# Patient Record
Sex: FEMALE | Race: WHITE | NOT HISPANIC OR LATINO | Employment: UNEMPLOYED | URBAN - METROPOLITAN AREA
[De-identification: names, ages, dates, MRNs, and addresses within clinical notes are randomized per-mention and may not be internally consistent; named-entity substitution may affect disease eponyms.]

---

## 2017-11-05 ENCOUNTER — HOSPITAL ENCOUNTER (EMERGENCY)
Facility: HOSPITAL | Age: 37
Discharge: HOME | End: 2017-11-05
Attending: EMERGENCY MEDICINE | Admitting: EMERGENCY MEDICINE
Payer: COMMERCIAL

## 2017-11-05 ENCOUNTER — APPOINTMENT (EMERGENCY)
Dept: RADIOLOGY | Facility: HOSPITAL | Age: 37
End: 2017-11-05
Payer: COMMERCIAL

## 2017-11-05 VITALS
DIASTOLIC BLOOD PRESSURE: 74 MMHG | TEMPERATURE: 97.8 F | OXYGEN SATURATION: 100 % | HEART RATE: 75 BPM | SYSTOLIC BLOOD PRESSURE: 161 MMHG | WEIGHT: 114 LBS | RESPIRATION RATE: 18 BRPM

## 2017-11-05 DIAGNOSIS — S92.355A NONDISPLACED FRACTURE OF FIFTH METATARSAL BONE, LEFT FOOT, INITIAL ENCOUNTER FOR CLOSED FRACTURE: Primary | ICD-10-CM

## 2017-11-05 PROCEDURE — 73630 X-RAY EXAM OF FOOT: CPT

## 2017-11-05 PROCEDURE — 99283 EMERGENCY DEPT VISIT LOW MDM: CPT

## 2017-11-05 RX ORDER — METHOCARBAMOL 500 MG/1
500 TABLET, FILM COATED ORAL ONCE
Status: COMPLETED | OUTPATIENT
Start: 2017-11-05 | End: 2017-11-05

## 2017-11-05 RX ORDER — IBUPROFEN 600 MG/1
600 TABLET ORAL EVERY 6 HOURS PRN
Qty: 30 TABLET | Refills: 0 | Status: SHIPPED | OUTPATIENT
Start: 2017-11-05 | End: 2017-11-08

## 2017-11-05 RX ORDER — METHOCARBAMOL 500 MG/1
1000 TABLET, FILM COATED ORAL ONCE
Status: COMPLETED | OUTPATIENT
Start: 2017-11-05 | End: 2017-11-05

## 2017-11-05 RX ORDER — METHOCARBAMOL 750 MG/1
750 TABLET, FILM COATED ORAL 3 TIMES DAILY
Qty: 15 TABLET | Refills: 0 | Status: SHIPPED | OUTPATIENT
Start: 2017-11-05 | End: 2021-04-15

## 2017-11-05 RX ADMIN — METHOCARBAMOL 500 MG: 500 TABLET ORAL at 21:18

## 2017-11-05 RX ADMIN — METHOCARBAMOL 500 MG: 500 TABLET ORAL at 21:37

## 2017-11-06 NOTE — DISCHARGE INSTRUCTIONS
Foot Fracture in Adults   WHAT YOU NEED TO KNOW:   A foot fracture is a break in one or more of the bones in your foot  Foot fractures are commonly caused by trauma, falls, or repeated stress injuries  DISCHARGE INSTRUCTIONS:   Medicines:   · Antibiotics: This medicine is given to help treat or prevent an infection caused by bacteria  · NSAIDs:  These medicines decrease swelling and pain  NSAIDs are available without a doctor's order  Ask which medicine is right for you  Ask how much to take and when to take it  Take as directed  NSAIDs can cause stomach bleeding and kidney problems if not taken correctly  · Pain medicine: You may be given a prescription medicine to decrease pain  Do not wait until the pain is severe before you take this medicine  · Take your medicine as directed  Contact your healthcare provider if you think your medicine is not helping or if you have side effects  Tell him of her if you are allergic to any medicine  Keep a list of the medicines, vitamins, and herbs you take  Include the amounts, and when and why you take them  Bring the list or the pill bottles to follow-up visits  Carry your medicine list with you in case of an emergency  Follow up with your healthcare provider or bone specialist as directed: You may need to return to have your splint or stitches removed  You may also need to return for tests to make sure your foot is healing  Write down your questions so you remember to ask them during your visits  Wound care:  Carefully wash the wound with soap and water  Dry the area and put on new, clean bandages as directed  Change your bandages when they get wet or dirty  Self-care:   · Rest:  You may need to rest your foot and avoid activities that cause pain  For stress fractures, you will need to avoid the activity that caused the fracture until it heals  Ask when you can return to your normal activities such as work and sports      · Ice:  Ice helps decrease swelling and pain  Ice may also help prevent tissue damage  Use an ice pack or put crushed ice in a plastic bag  Cover it with a towel, and place it on your foot for 15 to 20 minutes every hour as directed  · Elevate your foot:  Raise your foot at or above the level of your heart as often as you can  This will help decrease swelling and pain  Prop your foot on pillows or blankets to keep it elevated comfortably  · Physical therapy: Once your foot has healed, a physical therapist can teach you exercises to help improve movement and strength, and to decrease pain  Splint care:   · Check the skin around your splint daily for any redness or open areas  · Do not use a sharp or pointed object to scratch your skin under the splint  · Do not remove your splint unless your healthcare provider or orthopedic surgeon says it is okay  Bathing with a splint:  Do not let your splint get wet  Before bathing, cover the splint with a plastic bag  Tape the bag to your skin above the splint to seal out the water  Keep your foot out of the water in case the bag leaks  Ask when it is okay to take a bath or shower  Assistive devices: You may be given a hard-soled shoe to wear while your foot is healing  You also may need to use crutches to help you walk while your foot heals  It is important to use your crutches correctly  Ask for more information about how to use crutches  Contact your healthcare provider or bone specialist if:   · You have a fever  · You have new sores around your boot or splint  · You have new or worsening trouble moving your foot  · You notice a foul smell coming from under your splint  · Your boot or splint gets damaged  · You have questions or concerns about your condition or care  Return to the emergency department if:   · The pain in your injured foot gets worse even after you rest and take pain medicine      · The skin or toes of your foot become numb, swollen, cold, white, or blue     · You have more pain or swelling than you did before the splint was put on  · Your wound is draining fluid or pus  · Blood soaks through your bandage  · Your leg feels warm, tender, and painful  It may look swollen and red  · You suddenly feel lightheaded and short of breath  · You have chest pain when you take a deep breath or cough  You may cough up blood  © 2017 [employer]  Information is for End User's use only and may not be sold, redistributed or otherwise used for commercial purposes  All illustrations and images included in CareNotes® are the copyrighted property of [employer]  or [employer]  The above information is an  only  It is not intended as medical advice for individual conditions or treatments  Talk to your doctor, nurse or pharmacist before following any medical regimen to see if it is safe and effective for you

## 2017-11-06 NOTE — ED PROVIDER NOTES
History  Chief Complaint   Patient presents with    Foot Injury     from home with left foot injury last night after falling     65-year-old female patient presents emergency department for evaluation of left foot injury sustained yesterday while she was wearing slippers, walking down the steps of her deck, twisted her foot in a way that she is not quite sure how  Patient does have ecchymosis and swelling of the lateral aspect of the foot  X-ray will be done to assess for Huber fracture  History provided by:  Patient   used: No    Foot Injury - Major   Location:  Foot  Injury: no    Foot location:  L foot  Pain details:     Quality:  Aching    Radiates to:  Does not radiate    Severity:  Mild    Onset quality:  Sudden    Timing:  Constant    Progression:  Worsening  Chronicity:  New  Dislocation: no    Tetanus status:  Up to date  Prior injury to area:  No  Relieved by:  Nothing  Worsened by:  Nothing  Ineffective treatments:  None tried  Associated symptoms: no decreased ROM, no fatigue, no itching, no muscle weakness, no numbness and no stiffness        None       History reviewed  No pertinent past medical history  History reviewed  No pertinent surgical history  History reviewed  No pertinent family history  I have reviewed and agree with the history as documented  Social History   Substance Use Topics    Smoking status: Current Every Day Smoker    Smokeless tobacco: Current User    Alcohol use No        Review of Systems   Constitutional: Negative for fatigue  Musculoskeletal: Negative for stiffness  Skin: Negative for itching  All other systems reviewed and are negative        Physical Exam  ED Triage Vitals [11/05/17 1949]   Temperature Pulse Respirations Blood Pressure SpO2   97 8 °F (36 6 °C) 80 18 113/69 100 %      Temp Source Heart Rate Source Patient Position - Orthostatic VS BP Location FiO2 (%)   Oral Monitor Sitting Left arm --      Pain Score       6 Orthostatic Vital Signs  Vitals:    11/05/17 1949   BP: 113/69   Pulse: 80   Patient Position - Orthostatic VS: Sitting       Physical Exam   Constitutional: She is oriented to person, place, and time  She appears well-developed and well-nourished  HENT:   Head: Normocephalic and atraumatic  Right Ear: External ear normal    Left Ear: External ear normal    Eyes: Conjunctivae and EOM are normal    Neck: No JVD present  No tracheal deviation present  No thyromegaly present  Cardiovascular: Normal rate  Pulmonary/Chest: Effort normal and breath sounds normal  No stridor  Abdominal: Soft  She exhibits no distension and no mass  There is no tenderness  There is no guarding  No hernia  Musculoskeletal: Normal range of motion  She exhibits no edema, tenderness or deformity  Feet:    Lymphadenopathy:     She has no cervical adenopathy  Neurological: She is alert and oriented to person, place, and time  Skin: Skin is warm  No rash noted  No erythema  No pallor  Psychiatric: She has a normal mood and affect  Her behavior is normal    Nursing note and vitals reviewed        ED Medications  Medications - No data to display    Diagnostic Studies  Results Reviewed     None                 XR foot 3+ views LEFT    (Results Pending)              Procedures  Procedures       Phone Contacts  ED Phone Contact    ED Course  ED Course                                MDM  Number of Diagnoses or Management Options  displaced fracture of fifth metatarsal bone, left foot, initial encounter for closed fracture: new and requires workup     Amount and/or Complexity of Data Reviewed  Tests in the radiology section of CPT®: ordered and reviewed  Decide to obtain previous medical records or to obtain history from someone other than the patient: yes  Review and summarize past medical records: yes    Patient Progress  Patient progress: stable    CritCare Time    Disposition  Final diagnoses:   None     ED Disposition     None      Follow-up Information    None       Patient's Medications    No medications on file     No discharge procedures on file      ED Provider  Electronically Signed by           Kuldip Panchal DO  11/06/17 6268

## 2018-07-27 ENCOUNTER — HOSPITAL ENCOUNTER (EMERGENCY)
Facility: HOSPITAL | Age: 38
Discharge: HOME | End: 2018-07-28
Attending: EMERGENCY MEDICINE | Admitting: EMERGENCY MEDICINE
Payer: COMMERCIAL

## 2018-07-27 DIAGNOSIS — J06.9 VIRAL URI WITH COUGH: Primary | ICD-10-CM

## 2018-07-28 ENCOUNTER — APPOINTMENT (EMERGENCY)
Dept: RADIOLOGY | Facility: HOSPITAL | Age: 38
End: 2018-07-28
Payer: COMMERCIAL

## 2018-07-28 VITALS
HEIGHT: 66 IN | HEART RATE: 83 BPM | BODY MASS INDEX: 20.69 KG/M2 | OXYGEN SATURATION: 99 % | SYSTOLIC BLOOD PRESSURE: 139 MMHG | WEIGHT: 128.75 LBS | TEMPERATURE: 98.9 F | DIASTOLIC BLOOD PRESSURE: 71 MMHG | RESPIRATION RATE: 19 BRPM

## 2018-07-28 LAB — S PYO AG THROAT QL: NEGATIVE

## 2018-07-28 PROCEDURE — 71046 X-RAY EXAM CHEST 2 VIEWS: CPT

## 2018-07-28 PROCEDURE — 87430 STREP A AG IA: CPT | Performed by: EMERGENCY MEDICINE

## 2018-07-28 PROCEDURE — 99283 EMERGENCY DEPT VISIT LOW MDM: CPT

## 2018-07-28 RX ORDER — GUAIFENESIN 600 MG
1200 TABLET, EXTENDED RELEASE 12 HR ORAL EVERY 12 HOURS PRN
Qty: 12 TABLET | Refills: 0 | Status: SHIPPED | OUTPATIENT
Start: 2018-07-28 | End: 2020-09-01

## 2018-07-28 RX ORDER — IBUPROFEN 400 MG/1
400 TABLET ORAL ONCE
Status: COMPLETED | OUTPATIENT
Start: 2018-07-28 | End: 2018-07-28

## 2018-07-28 RX ORDER — NAPROXEN 500 MG/1
500 TABLET ORAL EVERY 12 HOURS PRN
Qty: 20 TABLET | Refills: 0 | Status: SHIPPED | OUTPATIENT
Start: 2018-07-28 | End: 2022-01-05

## 2018-07-28 RX ADMIN — DEXAMETHASONE SODIUM PHOSPHATE 10 MG: 10 INJECTION, SOLUTION INTRAMUSCULAR; INTRAVENOUS at 00:58

## 2018-07-28 RX ADMIN — IBUPROFEN 400 MG: 400 TABLET ORAL at 00:59

## 2018-07-28 NOTE — DISCHARGE INSTRUCTIONS

## 2018-07-28 NOTE — ED PROVIDER NOTES
History  Chief Complaint   Patient presents with    URI     Pt reports of URI s/s started approx 3 days ago  Patient is a 47-year-old female with no significant past medical history, presenting to the emergency department complaining of 3 days of upper respiratory symptoms  Patient states she has had sore throat, runny nose and mostly dry cough for the past 3 days  She states she occasionally brings up sputum  She has tried herbal remedy without any relief  She reports several weeks ago her daughter was treated for pneumonia  She does report feeling mildly short of breath with coughing and occasional chest pain only with coughing  Denies chest pain not associated with cough  She denies any associated fever, chills, headache, dizziness or near syncope, ear pain or discharge, nasal congestion, neck pain or stiffness, difficulty handling oral secretions or drooling, dental pain, skin rash or color change, chest pain or dyspnea currently, palpitations, wheezing, abdominal pain, nausea, vomiting, diarrhea, constipation, urinary symptoms, extremity weakness or paresthesia or other focal neurologic deficits  She does admit to smoking cigarettes daily  No drug use  Denies history of asthma or COPD  Denies recent travel outside the country  History provided by:  Patient   used: No    URI   Presenting symptoms: cough, rhinorrhea and sore throat    Presenting symptoms: no congestion, no ear pain and no fever    Associated symptoms: no headaches, no neck pain, no sinus pain and no wheezing        Prior to Admission Medications   Prescriptions Last Dose Informant Patient Reported?  Taking?   ibuprofen (MOTRIN) 600 mg tablet   No No   Sig: Take 1 tablet by mouth every 6 (six) hours as needed for mild pain for up to 3 days   methocarbamol (ROBAXIN) 750 mg tablet   No No   Sig: Take 1 tablet by mouth 3 (three) times a day for 5 days      Facility-Administered Medications: None History reviewed  No pertinent past medical history  History reviewed  No pertinent surgical history  History reviewed  No pertinent family history  I have reviewed and agree with the history as documented  Social History   Substance Use Topics    Smoking status: Current Every Day Smoker    Smokeless tobacco: Current User    Alcohol use No        Review of Systems   Constitutional: Negative for chills and fever  HENT: Positive for rhinorrhea and sore throat  Negative for congestion, dental problem, drooling, ear discharge, ear pain, mouth sores, sinus pain, trouble swallowing and voice change  Eyes: Negative for pain and visual disturbance  Respiratory: Positive for cough  Negative for chest tightness, shortness of breath and wheezing  Cardiovascular: Negative for chest pain and palpitations  Gastrointestinal: Negative for abdominal pain, constipation, diarrhea, nausea and vomiting  Genitourinary: Negative for dysuria, flank pain, frequency and hematuria  Musculoskeletal: Negative for back pain, neck pain and neck stiffness  Skin: Negative for color change, pallor and rash  Allergic/Immunologic: Negative for immunocompromised state  Neurological: Negative for dizziness, syncope, weakness, light-headedness, numbness and headaches  Hematological: Positive for adenopathy  Psychiatric/Behavioral: Negative for confusion and decreased concentration  All other systems reviewed and are negative  Physical Exam  Physical Exam   Constitutional: She is oriented to person, place, and time  She appears well-developed and well-nourished  No distress  HENT:   Head: Normocephalic and atraumatic  Right Ear: External ear normal    Left Ear: External ear normal    Mouth/Throat: Oropharynx is clear and moist  No oropharyngeal exudate  Bilateral TMs appear normal  Mild bilateral tonsillar hypertrophy and erythema  No tonsillar exudate  Uvula midline   No evidence of peritonsillar abscess  Normal dentition  Eyes: Conjunctivae and EOM are normal  Pupils are equal, round, and reactive to light  Neck: Normal range of motion  Neck supple  No JVD present  Bilateral submandibular and anterior cervical lymphadenopathy  Cardiovascular: Normal rate, regular rhythm, normal heart sounds and intact distal pulses  Exam reveals no gallop and no friction rub  No murmur heard  Pulmonary/Chest: Effort normal and breath sounds normal  No respiratory distress  She has no wheezes  She has no rales  She exhibits no tenderness  Abdominal: Soft  Bowel sounds are normal  She exhibits no distension  There is no tenderness  There is no rebound and no guarding  Musculoskeletal: Normal range of motion  She exhibits no edema or tenderness  Lymphadenopathy:     She has cervical adenopathy  Neurological: She is alert and oriented to person, place, and time  No cranial nerve deficit  No gross motor or sensory deficits  Skin: Skin is warm and dry  No rash noted  She is not diaphoretic  No erythema  No pallor  Psychiatric: She has a normal mood and affect  Her behavior is normal    Nursing note and vitals reviewed        Vital Signs  ED Triage Vitals [07/28/18 0002]   Temperature Pulse Respirations Blood Pressure SpO2   98 9 °F (37 2 °C) 83 19 139/71 99 %      Temp Source Heart Rate Source Patient Position - Orthostatic VS BP Location FiO2 (%)   Oral Monitor -- -- --      Pain Score       3         Vitals:    07/28/18 0002   BP: 139/71   Pulse: 83   Resp: 19   Temp: 98 9 °F (37 2 °C)   TempSrc: Oral   SpO2: 99%   Weight: 58 4 kg (128 lb 12 oz)   Height: 5' 6" (1 676 m)     Visual Acuity      ED Medications  Medications   dexamethasone 10 mg/mL oral liquid 10 mg 1 mL (10 mg Oral Given 7/28/18 0058)   ibuprofen (MOTRIN) tablet 400 mg (400 mg Oral Given 7/28/18 0059)       Diagnostic Studies  Results Reviewed     Procedure Component Value Units Date/Time    Rapid Strep A Screen Only, Adults [[id number]] (Normal) Collected:  07/28/18 0058    Lab Status:  Final result Specimen:  Throat from Throat Updated:  07/28/18 0113     Rapid Strep A Screen Negative                 XR chest 2 views   ED Interpretation by Manuela Valentine DO (07/28 0123)   No acute abnormality in the chest                  Procedures  Procedures       Phone Contacts  ED Phone Contact    ED Course  ED Course as of Jul 28 0144   Sat Jul 28, 2018   0133 Updated patient of negative strep and normal chest x-ray  Will send home with scripts for mucus in ex and Naprosyn  Advised her to follow up with PCP and discussed ED return parameters  MDM  Number of Diagnoses or Management Options  Diagnosis management comments: 53-year-old female presents with 3 days of cough and URI symptoms  Most likely this is viral however given daughter had recent pneumonia, will evaluate with chest x-ray to rule this out  Will give 1 time dose of Decadron as well as ibuprofen for symptomatic relief  Will also swab for rapid strep however overall low suspicion for strep pharyngitis  If workup unremarkable, will discharge home with scripts for Naprosyn for pain and instruction to follow up with PCP on Monday  Discussed ED return parameters  Amount and/or Complexity of Data Reviewed  Tests in the radiology section of CPT®: ordered and reviewed  Independent visualization of images, tracings, or specimens: yes      CritCare Time    Disposition  Final diagnoses:   Viral URI with cough     Time reflects when diagnosis was documented in both MDM as applicable and the Disposition within this note     Time User Action Codes Description Comment    7/28/2018  1:42 AM Pasquale Ryan [J06 9,  B97 [id number]] Viral URI with cough       ED Disposition     ED Disposition Condition Comment    Discharge  Loreto Wu discharge to home/self care      Condition at discharge: Stable        Follow-up Information     Follow up With Specialties Details Why Contact Info Additional [address] [place] [address] MD [facility] Schedule an appointment as soon as possible for a visit  [address] Emergency Department Emergency Medicine Go to If symptoms worsen [address] [place] [id number]  [phone] [place] [facility], [address] [place] [address], [phone]          Patient's Medications   Discharge Prescriptions    GUAIFENESIN (MUCINEX) 600 MG 12 HR TABLET    Take 2 tablets (1,200 mg total) by mouth every 12 (twelve) hours as needed for cough or congestion       Start Date: 7/28/2018 End Date: --       Order Dose: 1,200 mg       Quantity: 12 tablet    Refills: 0    NAPROXEN (NAPROSYN) 500 MG TABLET    Take 1 tablet (500 mg total) by mouth every 12 (twelve) hours as needed for mild pain or moderate pain       Start Date: 7/28/2018 End Date: --       Order Dose: 500 mg       Quantity: 20 tablet    Refills: 0     No discharge procedures on file      ED Provider  Electronically Signed by           Addis Urias DO  07/28/18 6305

## 2019-03-13 RX ORDER — CYCLOSPORINE 0.5 MG/ML
EMULSION OPHTHALMIC
COMMUNITY
Start: 2017-11-03

## 2019-03-13 RX ORDER — PREDNISONE 10 MG/1
10 TABLET ORAL
COMMUNITY
Start: 2019-03-05 | End: 2021-04-15

## 2019-03-13 RX ORDER — VALACYCLOVIR HYDROCHLORIDE 500 MG/1
TABLET, FILM COATED ORAL
COMMUNITY
Start: 2017-02-21

## 2019-03-13 RX ORDER — FLUTICASONE PROPIONATE 50 MCG
1 SPRAY, SUSPENSION (ML) NASAL
COMMUNITY
Start: 2019-03-05 | End: 2022-02-18

## 2019-03-13 RX ORDER — DICYCLOMINE HCL 20 MG
TABLET ORAL
COMMUNITY
Start: 2018-12-09 | End: 2021-04-15

## 2019-03-14 ENCOUNTER — OFFICE VISIT (OUTPATIENT)
Dept: GASTROENTEROLOGY | Facility: CLINIC | Age: 39
End: 2019-03-14
Payer: COMMERCIAL

## 2019-03-14 VITALS
WEIGHT: 120.6 LBS | BODY MASS INDEX: 19.47 KG/M2 | DIASTOLIC BLOOD PRESSURE: 82 MMHG | SYSTOLIC BLOOD PRESSURE: 106 MMHG | HEART RATE: 91 BPM

## 2019-03-14 DIAGNOSIS — R10.11 RIGHT UPPER QUADRANT ABDOMINAL PAIN: Primary | ICD-10-CM

## 2019-03-14 PROCEDURE — 99214 OFFICE O/P EST MOD 30 MIN: CPT | Performed by: PHYSICIAN ASSISTANT

## 2019-03-14 RX ORDER — ZINC GLUCONATE 50 MG
50 TABLET ORAL DAILY
COMMUNITY

## 2019-03-14 RX ORDER — FLUCONAZOLE 150 MG/1
TABLET ORAL
Refills: 6 | COMMUNITY
Start: 2019-02-14 | End: 2021-01-27 | Stop reason: SDUPTHER

## 2019-03-14 RX ORDER — MELATONIN
1000 DAILY
COMMUNITY

## 2019-03-14 RX ORDER — AZITHROMYCIN 500 MG/1
TABLET, FILM COATED ORAL
Refills: 0 | COMMUNITY
Start: 2019-03-09 | End: 2021-04-15

## 2019-03-14 RX ORDER — IBUPROFEN 800 MG/1
TABLET ORAL
Refills: 0 | COMMUNITY
Start: 2018-12-13

## 2019-03-14 RX ORDER — OSELTAMIVIR PHOSPHATE 75 MG/1
CAPSULE ORAL
Refills: 0 | COMMUNITY
Start: 2018-12-26 | End: 2020-09-01

## 2019-03-14 NOTE — PROGRESS NOTES
[facility] - Outpatient Follow-up Note  Amanda Johnson 41 y o  female MRN: [id number]  Encounter: [id number]          ASSESSMENT AND PLAN:      1  Right upper quadrant abdominal pain  Update US to f/u on polyps  Short Zantac 150mg daily course  Bentyl PRN  Probiotic      - US abdomen complete; Future    ______________________________________________________________________    SUBJECTIVE:  Patient presents with c/o RUQ pain  Symptoms began last Thursday after eating chicken noodle soup  She reports the pain had been more severe but is improving  There is some associated nausea without vomiting  She denies heartburn, dysphagia, diarrhea, bloating or constipation  She has been on antibiotics recently for a tonsillitis  She has a history of GB polyps which are less than 1cm  She is having a diagnostic laparoscopy with her gynecologist on 3/29 to evaluate for endometriosis  REVIEW OF SYSTEMS IS OTHERWISE NEGATIVE        Historical Information   Past Medical History:   Diagnosis Date    Cholelithiasis      Past Surgical History:   Procedure Laterality Date    COLONOSCOPY      less than 5 yrs ago     Social History   Social History     Substance and Sexual Activity   Alcohol Use Yes    Comment: occ     Social History     Substance and Sexual Activity   Drug Use No     Social History     Tobacco Use   Smoking Status Current Every Day Smoker   Smokeless Tobacco Current User     Family History   Problem Relation Age of Onset    No Known Problems Mother     Cancer Father         large intestine       Meds/Allergies       Current Outpatient Medications:     Ascorbic Acid 500 MG CHEW    cholecalciferol (VITAMIN D3) 1,000 units tablet    cycloSPORINE (RESTASIS) 0 05 % ophthalmic emulsion    dicyclomine (BENTYL) 20 mg tablet    fluconazole (DIFLUCAN) 150 mg tablet    fluticasone (FLONASE) 50 mcg/act nasal spray    guaiFENesin (MUCINEX) 600 mg 12 hr tablet    ibuprofen (MOTRIN) 800 mg tablet    MAGNESIUM PO    oseltamivir (TAMIFLU) 75 mg capsule    terconazole (TERAZOL 7) 0 4 % vaginal cream    valACYclovir (VALTREX) 500 mg tablet    zinc gluconate 50 mg tablet    azithromycin (ZITHROMAX) 500 MG tablet    methocarbamol (ROBAXIN) 750 mg tablet    naproxen (NAPROSYN) 500 mg tablet    predniSONE 10 MG (21) TBPK    Allergies   Allergen Reactions    Pollen Extract Itching     Sneezing, itching in the eyes           Objective     Blood pressure 106/82, pulse 91, weight 54 7 kg (120 lb 9 6 oz)  Body mass index is 19 47 kg/m²  PHYSICAL EXAM:      General Appearance:   Alert, cooperative, no distress   HEENT:   Normocephalic, atraumatic, anicteric      Neck:  Supple, symmetrical, trachea midline   Lungs:   Clear to auscultation bilaterally; no rales, rhonchi or wheezing; respirations unlabored    Heart[de-identified]   Regular rate and rhythm; no murmur, rub, or gallop  Abdomen:   Soft, non-tender, non-distended; normal bowel sounds; no masses, no organomegaly    Genitalia:   Deferred    Rectal:   Deferred    Extremities:  No cyanosis, clubbing or edema    Pulses:  2+ and symmetric    Skin:  No jaundice, rashes, or lesions    Lymph nodes:  No palpable cervical lymphadenopathy        Lab Results:   No visits with results within 1 Day(s) from this visit  Latest known visit with results is:   Admission on 07/27/2018, Discharged on 07/28/2018   Component Date Value    Rapid Strep A Screen 07/28/2018 Negative          Radiology Results:   No results found

## 2019-03-18 ENCOUNTER — HOSPITAL ENCOUNTER (OUTPATIENT)
Dept: ULTRASOUND IMAGING | Facility: CLINIC | Age: 39
Discharge: HOME | End: 2019-03-18
Payer: COMMERCIAL

## 2019-03-18 DIAGNOSIS — R10.11 RIGHT UPPER QUADRANT ABDOMINAL PAIN: ICD-10-CM

## 2019-03-18 PROCEDURE — 76700 US EXAM ABDOM COMPLETE: CPT

## 2019-03-21 ENCOUNTER — TELEPHONE (OUTPATIENT)
Dept: GASTROENTEROLOGY | Facility: CLINIC | Age: 39
End: 2019-03-21

## 2019-03-21 NOTE — TELEPHONE ENCOUNTER
Dr Lopez Overall of Abdomin Report is in [address]  Findings are significant   This is for [facility]

## 2019-11-14 ENCOUNTER — HOSPITAL ENCOUNTER (EMERGENCY)
Facility: HOSPITAL | Age: 39
Discharge: HOME | End: 2019-11-15
Attending: EMERGENCY MEDICINE | Admitting: EMERGENCY MEDICINE
Payer: COMMERCIAL

## 2019-11-14 DIAGNOSIS — N83.209 OVARIAN CYST: Primary | ICD-10-CM

## 2019-11-14 DIAGNOSIS — K59.00 CONSTIPATION: ICD-10-CM

## 2019-11-14 LAB
ALBUMIN SERPL BCP-MCNC: 3.9 G/DL (ref 3.5–5)
ALP SERPL-CCNC: 61 U/L (ref 46–116)
ALT SERPL W P-5'-P-CCNC: 24 U/L (ref 12–78)
ANION GAP SERPL CALCULATED.3IONS-SCNC: 10 MMOL/L (ref 4–13)
AST SERPL W P-5'-P-CCNC: 20 U/L (ref 5–45)
BASOPHILS # BLD AUTO: 0.05 THOUSANDS/ΜL (ref 0–0.1)
BASOPHILS NFR BLD AUTO: 1 % (ref 0–1)
BILIRUB DIRECT SERPL-MCNC: 0.08 MG/DL (ref 0–0.2)
BILIRUB SERPL-MCNC: 0.3 MG/DL (ref 0.2–1)
BILIRUB UR QL STRIP: NEGATIVE
BUN SERPL-MCNC: 19 MG/DL (ref 5–25)
CALCIUM SERPL-MCNC: 9.1 MG/DL (ref 8.3–10.1)
CHLORIDE SERPL-SCNC: 100 MMOL/L (ref 100–108)
CLARITY UR: CLEAR
CO2 SERPL-SCNC: 28 MMOL/L (ref 21–32)
COLOR UR: YELLOW
CREAT SERPL-MCNC: 0.82 MG/DL (ref 0.6–1.3)
EOSINOPHIL # BLD AUTO: 0.16 THOUSAND/ΜL (ref 0–0.61)
EOSINOPHIL NFR BLD AUTO: 2 % (ref 0–6)
ERYTHROCYTE [DISTWIDTH] IN BLOOD BY AUTOMATED COUNT: 12.6 % (ref 11.6–15.1)
GFR SERPL CREATININE-BSD FRML MDRD: 92 ML/MIN/1.73SQ M
GLUCOSE SERPL-MCNC: 92 MG/DL (ref 65–140)
GLUCOSE UR STRIP-MCNC: NEGATIVE MG/DL
HCT VFR BLD AUTO: 42.6 % (ref 34.8–46.1)
HGB BLD-MCNC: 14.1 G/DL (ref 11.5–15.4)
HGB UR QL STRIP.AUTO: NEGATIVE
IMM GRANULOCYTES # BLD AUTO: 0.02 THOUSAND/UL (ref 0–0.2)
IMM GRANULOCYTES NFR BLD AUTO: 0 % (ref 0–2)
KETONES UR STRIP-MCNC: NEGATIVE MG/DL
LEUKOCYTE ESTERASE UR QL STRIP: NEGATIVE
LIPASE SERPL-CCNC: 130 U/L (ref 73–393)
LYMPHOCYTES # BLD AUTO: 2.72 THOUSANDS/ΜL (ref 0.6–4.47)
LYMPHOCYTES NFR BLD AUTO: 34 % (ref 14–44)
MCH RBC QN AUTO: 31.1 PG (ref 26.8–34.3)
MCHC RBC AUTO-ENTMCNC: 33.1 G/DL (ref 31.4–37.4)
MCV RBC AUTO: 94 FL (ref 82–98)
MONOCYTES # BLD AUTO: 0.76 THOUSAND/ΜL (ref 0.17–1.22)
MONOCYTES NFR BLD AUTO: 10 % (ref 4–12)
NEUTROPHILS # BLD AUTO: 4.19 THOUSANDS/ΜL (ref 1.85–7.62)
NEUTS SEG NFR BLD AUTO: 53 % (ref 43–75)
NITRITE UR QL STRIP: NEGATIVE
NRBC BLD AUTO-RTO: 0 /100 WBCS
PH UR STRIP.AUTO: 5.5 [PH]
PLATELET # BLD AUTO: 192 THOUSANDS/UL (ref 149–390)
PMV BLD AUTO: 11.2 FL (ref 8.9–12.7)
POTASSIUM SERPL-SCNC: 4.1 MMOL/L (ref 3.5–5.3)
PROT SERPL-MCNC: 7.8 G/DL (ref 6.4–8.2)
PROT UR STRIP-MCNC: NEGATIVE MG/DL
RBC # BLD AUTO: 4.53 MILLION/UL (ref 3.81–5.12)
SODIUM SERPL-SCNC: 138 MMOL/L (ref 136–145)
SP GR UR STRIP.AUTO: 1.01 (ref 1–1.03)
UROBILINOGEN UR QL STRIP.AUTO: 0.2 E.U./DL
WBC # BLD AUTO: 7.9 THOUSAND/UL (ref 4.31–10.16)

## 2019-11-14 PROCEDURE — 80048 BASIC METABOLIC PNL TOTAL CA: CPT | Performed by: EMERGENCY MEDICINE

## 2019-11-14 PROCEDURE — 83690 ASSAY OF LIPASE: CPT | Performed by: EMERGENCY MEDICINE

## 2019-11-14 PROCEDURE — 36415 COLL VENOUS BLD VENIPUNCTURE: CPT | Performed by: EMERGENCY MEDICINE

## 2019-11-14 PROCEDURE — 85025 COMPLETE CBC W/AUTO DIFF WBC: CPT | Performed by: EMERGENCY MEDICINE

## 2019-11-14 PROCEDURE — 81025 URINE PREGNANCY TEST: CPT | Performed by: EMERGENCY MEDICINE

## 2019-11-14 PROCEDURE — 81003 URINALYSIS AUTO W/O SCOPE: CPT | Performed by: EMERGENCY MEDICINE

## 2019-11-14 PROCEDURE — 99284 EMERGENCY DEPT VISIT MOD MDM: CPT

## 2019-11-14 PROCEDURE — 80076 HEPATIC FUNCTION PANEL: CPT | Performed by: EMERGENCY MEDICINE

## 2019-11-15 ENCOUNTER — APPOINTMENT (EMERGENCY)
Dept: CT IMAGING | Facility: HOSPITAL | Age: 39
End: 2019-11-15
Payer: COMMERCIAL

## 2019-11-15 VITALS
HEIGHT: 66 IN | TEMPERATURE: 97.9 F | SYSTOLIC BLOOD PRESSURE: 107 MMHG | WEIGHT: 127.21 LBS | DIASTOLIC BLOOD PRESSURE: 55 MMHG | HEART RATE: 82 BPM | OXYGEN SATURATION: 96 % | RESPIRATION RATE: 17 BRPM | BODY MASS INDEX: 20.44 KG/M2

## 2019-11-15 LAB
EXT PREG TEST URINE: NEGATIVE
EXT. CONTROL ED NAV: NORMAL

## 2019-11-15 PROCEDURE — 99284 EMERGENCY DEPT VISIT MOD MDM: CPT | Performed by: EMERGENCY MEDICINE

## 2019-11-15 PROCEDURE — 74177 CT ABD & PELVIS W/CONTRAST: CPT

## 2019-11-15 RX ORDER — DICYCLOMINE HCL 20 MG
20 TABLET ORAL 2 TIMES DAILY
Qty: 20 TABLET | Refills: 0 | Status: SHIPPED | OUTPATIENT
Start: 2019-11-15 | End: 2020-09-01

## 2019-11-15 RX ORDER — POLYETHYLENE GLYCOL 3350 17 G/17G
17 POWDER, FOR SOLUTION ORAL DAILY
Qty: 14 EACH | Refills: 0 | Status: SHIPPED | OUTPATIENT
Start: 2019-11-15 | End: 2020-09-01

## 2019-11-15 RX ADMIN — IOHEXOL 100 ML: 350 INJECTION, SOLUTION INTRAVENOUS at 00:55

## 2019-11-18 NOTE — ED PROVIDER NOTES
History  Chief Complaint   Patient presents with    Abdominal Pain     Pt presents to ED c/o bilateral lower quadrant pain x 3 days  Pt denies V/D but states that she has intermittent nausea  Pt also states that she has had urinary frequency but denies pain during urination or foul odor  42year old female patient presents to the emergency department for evaluation of suprapubic abdominal pain  Patient has a history of IBS, states this does not feel like her normal IBS pain so she came in tonight for evaluation  The patient is a peritoneal on physical exam does have tenderness in the lower right lower left quadrants  Differential diagnosis for the patient includes but is not limited to colitis, diverticulitis, constipation  History provided by:  Patient   used: No    Abdominal Pain   Pain location:  RLQ and LLQ  Pain quality: aching    Pain radiates to:  Does not radiate  Pain severity:  Mild  Onset quality:  Gradual  Chronicity:  New  Context: not awakening from sleep, not eating, not recent sexual activity and not sick contacts    Relieved by:  Nothing  Worsened by:  Nothing  Associated symptoms: no belching, no cough, no diarrhea, no fever, no flatus, no shortness of breath and no vaginal discharge        Prior to Admission Medications   Prescriptions Last Dose Informant Patient Reported? Taking? Ascorbic Acid 500 MG CHEW  Self Yes No   Si tab(s)   MAGNESIUM PO  Self Yes No   Si   azithromycin (ZITHROMAX) 500 MG tablet  Self Yes No   Sig: TAKE 1 TABLET (500 MG TOTAL) BY MOUTH DAILY FOR 2 DAYS     cholecalciferol (VITAMIN D3) 1,000 units tablet   Yes No   Sig: Take 1,000 Units by mouth daily   cycloSPORINE (RESTASIS) 0 05 % ophthalmic emulsion  Self Yes No   Sig: INSTILL 1 DROP INTO BOTH EYES EVERY TWELVE HOURS   dicyclomine (BENTYL) 20 mg tablet  Self Yes No   fluconazole (DIFLUCAN) 150 mg tablet  Self Yes No   Sig: TAKE 1 TABLET BY MOUTH FOR ONE DOSE  TAKE BEFORE MENSES fluticasone (FLONASE) 50 mcg/act nasal spray  Self Yes No   Si spray into each nostril   guaiFENesin (MUCINEX) 600 mg 12 hr tablet  Self No No   Sig: Take 2 tablets (1,200 mg total) by mouth every 12 (twelve) hours as needed for cough or congestion   ibuprofen (MOTRIN) 800 mg tablet  Self Yes No   Sig: TAKE 1 TABLET BY MOUTH EVERY 8 HOURS AS NEEDED FOR MODERATE PAIN   methocarbamol (ROBAXIN) 750 mg tablet   No No   Sig: Take 1 tablet by mouth 3 (three) times a day for 5 days   naproxen (NAPROSYN) 500 mg tablet  Self No No   Sig: Take 1 tablet (500 mg total) by mouth every 12 (twelve) hours as needed for mild pain or moderate pain   Patient not taking: Reported on 3/14/2019   oseltamivir (TAMIFLU) 75 mg capsule  Self Yes No   Sig: TAKE ONE CAPSULE BY MOUTH TWICE A DAY FOR 5 DAYS   predniSONE 10 MG (21) TBPK  Self Yes No   Sig: Take 10 mg by mouth   terconazole (TERAZOL 7) 0 4 % vaginal cream  Self Yes No   Sig: INSERT 1 APPLICATOR INTO THE VAGINA NIGHTLY FOR 7 DAYS    valACYclovir (VALTREX) 500 mg tablet  Self Yes No   Si tab(s)prn   zinc gluconate 50 mg tablet   Yes No   Sig: Take 50 mg by mouth daily      Facility-Administered Medications: None       Past Medical History:   Diagnosis Date    Cholelithiasis        Past Surgical History:   Procedure Laterality Date    COLONOSCOPY      less than 5 yrs ago    COMBINED ABDOMINOPLASTY AND LIPOSUCTION N/A        Family History   Problem Relation Age of Onset    No Known Problems Mother     Cancer Father         large intestine     I have reviewed and agree with the history as documented  Social History     Tobacco Use    Smoking status: Current Every Day Smoker     Packs/day: 0 50    Smokeless tobacco: Current User   Substance Use Topics    Alcohol use: Yes     Comment: occ    Drug use: No        Review of Systems   Constitutional: Negative for fever  Respiratory: Negative for cough and shortness of breath      Gastrointestinal: Positive for abdominal pain  Negative for diarrhea and flatus  Genitourinary: Negative for vaginal discharge  All other systems reviewed and are negative  Physical Exam  Physical Exam   Abdominal: There is tenderness in the right lower quadrant and left lower quadrant  Nursing note and vitals reviewed        Vital Signs  ED Triage Vitals [11/14/19 2300]   Temperature Pulse Respirations Blood Pressure SpO2   97 9 °F (36 6 °C) 94 18 129/67 97 %      Temp Source Heart Rate Source Patient Position - Orthostatic VS BP Location FiO2 (%)   Oral Monitor Sitting Right arm --      Pain Score       9           Vitals:    11/14/19 2300 11/14/19 2330 11/15/19 0000   BP: 129/67 128/77 107/55   Pulse: 94 81 82   Patient Position - Orthostatic VS: Sitting Lying Lying         Visual Acuity      ED Medications  Medications   iohexol (OMNIPAQUE) 350 MG/ML injection (MULTI-DOSE) 100 mL (100 mL Intravenous Given 11/15/19 0055)       Diagnostic Studies  Results Reviewed     Procedure Component Value Units Date/Time    POCT pregnancy, urine [[id number]]  (Normal) Resulted:  11/15/19 0004    Lab Status:  Final result Updated:  11/15/19 0004     EXT PREG TEST UR (Ref: Negative) negative     Control valid    Basic metabolic panel [[id number]] Collected:  11/14/19 2327    Lab Status:  Final result Specimen:  Blood from Arm, Right Updated:  11/14/19 2354     Sodium 138 mmol/L      Potassium 4 1 mmol/L      Chloride 100 mmol/L      CO2 28 mmol/L      ANION GAP 10 mmol/L      BUN 19 mg/dL      Creatinine 0 82 mg/dL      Glucose 92 mg/dL      Calcium 9 1 mg/dL      eGFR 92 ml/min/1 73sq m     Narrative:       [place] guidelines for Chronic Kidney Disease (CKD):     Stage 1 with normal or high GFR (GFR > 90 mL/min/1 73 square meters)    Stage 2 Mild CKD (GFR = 60-89 mL/min/1 73 square meters)    Stage 3A Moderate CKD (GFR = 45-59 mL/min/1 73 square meters)    Stage 3B Moderate CKD (GFR = 30-44 mL/min/1 73 square meters)    Stage 4 Severe CKD (GFR = 15-29 mL/min/1 73 square meters)    Stage 5 End Stage CKD (GFR <15 mL/min/1 73 square meters)  Note: GFR calculation is accurate only with a steady state creatinine    Hepatic function panel [[id number]]  (Normal) Collected:  11/14/19 2327    Lab Status:  Final result Specimen:  Blood from Arm, Right Updated:  11/14/19 2354     Total Bilirubin 0 30 mg/dL      Bilirubin, Direct 0 08 mg/dL      Alkaline Phosphatase 61 U/L      AST 20 U/L      ALT 24 U/L      Total Protein 7 8 g/dL      Albumin 3 9 g/dL     Lipase [[id number]]  (Normal) Collected:  11/14/19 2327    Lab Status:  Final result Specimen:  Blood from Arm, Right Updated:  11/14/19 2354     Lipase 130 u/L     UA w Reflex to Microscopic w Reflex to Culture [[id number]] Collected:  11/14/19 2328    Lab Status:  Final result Specimen:  Urine, Clean Catch Updated:  11/14/19 2341     Color, UA Yellow     Clarity, UA Clear     Specific Gravity, UA 1 010     pH, UA 5 5     Leukocytes, UA Negative     Nitrite, UA Negative     Protein, UA Negative mg/dl      Glucose, UA Negative mg/dl      Ketones, UA Negative mg/dl      Urobilinogen, UA 0 2 E U /dl      Bilirubin, UA Negative     Blood, UA Negative    CBC and differential [[id number]] Collected:  11/14/19 2327    Lab Status:  Final result Specimen:  Blood from Arm, Right Updated:  11/14/19 2339     WBC 7 90 Thousand/uL      RBC 4 53 Million/uL      Hemoglobin 14 1 g/dL      Hematocrit 42 6 %      MCV 94 fL      MCH 31 1 pg      MCHC 33 1 g/dL      RDW 12 6 %      MPV 11 2 fL      Platelets 723 Thousands/uL      nRBC 0 /100 WBCs      Neutrophils Relative 53 %      Immat GRANS % 0 %      Lymphocytes Relative 34 %      Monocytes Relative 10 %      Eosinophils Relative 2 %      Basophils Relative 1 %      Neutrophils Absolute 4 19 Thousands/µL      Immature Grans Absolute 0 02 Thousand/uL      Lymphocytes Absolute 2 72 Thousands/µL      Monocytes Absolute 0 76 Thousand/µL Eosinophils Absolute 0 16 Thousand/µL      Basophils Absolute 0 05 Thousands/µL                  CT abdomen pelvis with contrast   Final Result by Petra Childs DO (11/15 0121)      Large amount fecal material within the colon suggested of constipation  Small cystic lesion in the right adnexa            Workstation performed: [id number]                    Procedures  Procedures       ED Course                               MDM  Number of Diagnoses or Management Options  Constipation: new and requires workup  Ovarian cyst: new and requires workup  Diagnosis management comments: The patient is aware of the ovarian cyst and I expressed to her the importance that she have this further assessed by ob/gyn       Amount and/or Complexity of Data Reviewed  Clinical lab tests: ordered and reviewed  Tests in the radiology section of CPT®: reviewed and ordered  Decide to obtain previous medical records or to obtain history from someone other than the patient: yes  Review and summarize past medical records: yes    Patient Progress  Patient progress: stable      Disposition  Final diagnoses:   Ovarian cyst   Constipation     Time reflects when diagnosis was documented in both MDM as applicable and the Disposition within this note     Time User Action Codes Description Comment    11/15/2019  1:24 AM Ann Marie Zuñiga Add [N83 209] Ovarian cyst     11/15/2019  1:24 AM Ann Marie Zuñiga Add [K59 00] Constipation       ED Disposition     ED Disposition Condition Date/Time Comment    Discharge Stable Fri Nov 15, 2019  1:24 AM Miranda Eth discharge to home/self care              Follow-up Information     Follow up With Specialties Details Why Contact Info    Abby Gottlieb MD Obstetrics and Gynecology, Obstetrics, Gynecology   [phone] [address] [address]  [phone]            Discharge Medication List as of 11/15/2019  1:25 AM      START taking these medications    Details   !! dicyclomine (BENTYL) 20 mg tablet Take 1 tablet (20 mg total) by mouth 2 (two) times a day, Starting Fri 11/15/2019, Print      polyethylene glycol (MIRALAX) 17 g packet Take 17 g by mouth daily Take twice daily in juice until you have a BM and then two more doses  , Starting Fri 11/15/2019, Print       !! - Potential duplicate medications found  Please discuss with provider        CONTINUE these medications which have NOT CHANGED    Details   Ascorbic Acid 500 MG CHEW 1 tab(s), Historical Med      azithromycin (ZITHROMAX) 500 MG tablet TAKE 1 TABLET (500 MG TOTAL) BY MOUTH DAILY FOR 2 DAYS , Historical Med      cholecalciferol (VITAMIN D3) 1,000 units tablet Take 1,000 Units by mouth daily, Historical Med      cycloSPORINE (RESTASIS) 0 05 % ophthalmic emulsion INSTILL 1 DROP INTO BOTH EYES EVERY TWELVE HOURS, Historical Med      !! dicyclomine (BENTYL) 20 mg tablet Starting Sun 12/9/2018, Historical Med      fluconazole (DIFLUCAN) 150 mg tablet TAKE 1 TABLET BY MOUTH FOR ONE DOSE  TAKE BEFORE MENSES, Historical Med      fluticasone (FLONASE) 50 mcg/act nasal spray 1 spray into each nostril, Starting Tue 3/5/2019, Until Wed 3/4/2020, Historical Med      guaiFENesin (MUCINEX) 600 mg 12 hr tablet Take 2 tablets (1,200 mg total) by mouth every 12 (twelve) hours as needed for cough or congestion, Starting Sat 7/28/2018, Print      ibuprofen (MOTRIN) 800 mg tablet TAKE 1 TABLET BY MOUTH EVERY 8 HOURS AS NEEDED FOR MODERATE PAIN, Historical Med      MAGNESIUM PO 1, Historical Med      methocarbamol (ROBAXIN) 750 mg tablet Take 1 tablet by mouth 3 (three) times a day for 5 days, Starting Sun 11/5/2017, Until Fri 11/10/2017, Print      naproxen (NAPROSYN) 500 mg tablet Take 1 tablet (500 mg total) by mouth every 12 (twelve) hours as needed for mild pain or moderate pain, Starting Sat 7/28/2018, Print      oseltamivir (TAMIFLU) 75 mg capsule TAKE ONE CAPSULE BY MOUTH TWICE A DAY FOR 5 DAYS, Historical Med      predniSONE 10 MG (21) TBPK Take 10 mg by mouth, Starting Tue 3/5/2019, Historical Med      terconazole (TERAZOL 7) 0 4 % vaginal cream INSERT 1 APPLICATOR INTO THE VAGINA NIGHTLY FOR 7 DAYS , Historical Med      valACYclovir (VALTREX) 500 mg tablet 1 tab(s)prn, Historical Med      zinc gluconate 50 mg tablet Take 50 mg by mouth daily, Historical Med       !! - Potential duplicate medications found  Please discuss with provider  No discharge procedures on file      ED Provider  Electronically Signed by           Valentina Foster DO  11/18/19 8545

## 2020-08-01 ENCOUNTER — APPOINTMENT (EMERGENCY)
Dept: RADIOLOGY | Facility: HOSPITAL | Age: 40
End: 2020-08-01
Payer: COMMERCIAL

## 2020-08-01 ENCOUNTER — HOSPITAL ENCOUNTER (EMERGENCY)
Facility: HOSPITAL | Age: 40
Discharge: HOME | End: 2020-08-01
Attending: EMERGENCY MEDICINE
Payer: COMMERCIAL

## 2020-08-01 VITALS
HEIGHT: 66 IN | WEIGHT: 118.39 LBS | BODY MASS INDEX: 19.03 KG/M2 | TEMPERATURE: 98.2 F | RESPIRATION RATE: 19 BRPM | HEART RATE: 96 BPM | OXYGEN SATURATION: 99 %

## 2020-08-01 DIAGNOSIS — G56.31 SATURDAY NIGHT PALSY, RIGHT: Primary | ICD-10-CM

## 2020-08-01 PROCEDURE — 96372 THER/PROPH/DIAG INJ SC/IM: CPT

## 2020-08-01 PROCEDURE — 99283 EMERGENCY DEPT VISIT LOW MDM: CPT

## 2020-08-01 PROCEDURE — 99284 EMERGENCY DEPT VISIT MOD MDM: CPT | Performed by: EMERGENCY MEDICINE

## 2020-08-01 PROCEDURE — 73090 X-RAY EXAM OF FOREARM: CPT

## 2020-08-01 PROCEDURE — 73030 X-RAY EXAM OF SHOULDER: CPT

## 2020-08-01 PROCEDURE — 29125 APPL SHORT ARM SPLINT STATIC: CPT | Performed by: EMERGENCY MEDICINE

## 2020-08-01 RX ORDER — KETOROLAC TROMETHAMINE 30 MG/ML
30 INJECTION, SOLUTION INTRAMUSCULAR; INTRAVENOUS ONCE
Status: COMPLETED | OUTPATIENT
Start: 2020-08-01 | End: 2020-08-01

## 2020-08-01 RX ORDER — PREDNISONE 10 MG/1
TABLET ORAL
Qty: 40 TABLET | Refills: 0 | Status: SHIPPED | OUTPATIENT
Start: 2020-08-01 | End: 2020-08-11

## 2020-08-01 RX ORDER — PREDNISONE 20 MG/1
60 TABLET ORAL ONCE
Status: COMPLETED | OUTPATIENT
Start: 2020-08-01 | End: 2020-08-01

## 2020-08-01 RX ORDER — NAPROXEN 500 MG/1
500 TABLET ORAL 2 TIMES DAILY WITH MEALS
Qty: 30 TABLET | Refills: 0 | Status: SHIPPED | OUTPATIENT
Start: 2020-08-01 | End: 2021-11-22

## 2020-08-01 RX ADMIN — PREDNISONE 60 MG: 20 TABLET ORAL at 11:12

## 2020-08-01 RX ADMIN — KETOROLAC TROMETHAMINE 30 MG: 30 INJECTION, SOLUTION INTRAMUSCULAR at 11:15

## 2020-08-01 NOTE — ED PROVIDER NOTES
History  Chief Complaint   Patient presents with    Hand Problem     pt c/o right hand/wrist numbness, states she fall a sleep on a chair on her hand last night  Denies other injuries  47 y o  F presents w right hand/wrist numbness that she woke up with  She states she was at a party last night and fell asleep with her right arm over a chair  She does not know how long she slept this way  She states that when she woke up this AM she had numbness to the R hand/wrist with difficulty moving it  She is, with difficulty, able to have movement to the Radian, median, ulnar nerve  This was an acute change overnight  She also has pain to the ipsilateral chest wall anteriorly near the axilla  No change in skin to either area - chest wall or the right hand  No hx of similar In the past   No known trauma to this area  No other signs of neurologic disability  She has sensation to the area but it is decreased compared to the contralateral           Prior to Admission Medications   Prescriptions Last Dose Informant Patient Reported? Taking? Ascorbic Acid 500 MG CHEW  Self Yes No   Si tab(s)   MAGNESIUM PO  Self Yes No   Si   azithromycin (ZITHROMAX) 500 MG tablet  Self Yes No   Sig: TAKE 1 TABLET (500 MG TOTAL) BY MOUTH DAILY FOR 2 DAYS     cholecalciferol (VITAMIN D3) 1,000 units tablet   Yes No   Sig: Take 1,000 Units by mouth daily   cycloSPORINE (RESTASIS) 0 05 % ophthalmic emulsion  Self Yes No   Sig: INSTILL 1 DROP INTO BOTH EYES EVERY TWELVE HOURS   dicyclomine (BENTYL) 20 mg tablet  Self Yes No   dicyclomine (BENTYL) 20 mg tablet   No No   Sig: Take 1 tablet (20 mg total) by mouth 2 (two) times a day   fluconazole (DIFLUCAN) 150 mg tablet  Self Yes No   Sig: TAKE 1 TABLET BY MOUTH FOR ONE DOSE  TAKE BEFORE MENSES   fluticasone (FLONASE) 50 mcg/act nasal spray  Self Yes No   Si spray into each nostril   guaiFENesin (MUCINEX) 600 mg 12 hr tablet  Self No No   Sig: Take 2 tablets (1,200 mg total) by mouth every 12 (twelve) hours as needed for cough or congestion   ibuprofen (MOTRIN) 800 mg tablet  Self Yes No   Sig: TAKE 1 TABLET BY MOUTH EVERY 8 HOURS AS NEEDED FOR MODERATE PAIN   methocarbamol (ROBAXIN) 750 mg tablet   No No   Sig: Take 1 tablet by mouth 3 (three) times a day for 5 days   naproxen (NAPROSYN) 500 mg tablet  Self No No   Sig: Take 1 tablet (500 mg total) by mouth every 12 (twelve) hours as needed for mild pain or moderate pain   Patient not taking: Reported on 3/14/2019   oseltamivir (TAMIFLU) 75 mg capsule  Self Yes No   Sig: TAKE ONE CAPSULE BY MOUTH TWICE A DAY FOR 5 DAYS   polyethylene glycol (MIRALAX) 17 g packet   No No   Sig: Take 17 g by mouth daily Take twice daily in juice until you have a BM and then two more doses  predniSONE 10 MG (21) TBPK  Self Yes No   Sig: Take 10 mg by mouth   terconazole (TERAZOL 7) 0 4 % vaginal cream  Self Yes No   Sig: INSERT 1 APPLICATOR INTO THE VAGINA NIGHTLY FOR 7 DAYS    valACYclovir (VALTREX) 500 mg tablet  Self Yes No   Si tab(s)prn   zinc gluconate 50 mg tablet   Yes No   Sig: Take 50 mg by mouth daily      Facility-Administered Medications: None       Past Medical History:   Diagnosis Date    Cholelithiasis        Past Surgical History:   Procedure Laterality Date    COLONOSCOPY      less than 5 yrs ago    COMBINED ABDOMINOPLASTY AND LIPOSUCTION N/A        Family History   Problem Relation Age of Onset    No Known Problems Mother     Cancer Father         large intestine     I have reviewed and agree with the history as documented  E-Cigarette/Vaping    E-Cigarette Use Never User      E-Cigarette/Vaping Substances     Social History     Tobacco Use    Smoking status: Current Every Day Smoker     Packs/day: 0 50     Types: Cigarettes    Smokeless tobacco: Current User   Substance Use Topics    Alcohol use: Yes     Comment: occ    Drug use: No       Review of Systems   Constitutional: Negative for chills and fever     HENT: Negative for congestion and rhinorrhea  Respiratory: Negative for chest tightness and shortness of breath  Cardiovascular: Negative for chest pain and leg swelling  Gastrointestinal: Negative for abdominal pain, nausea and vomiting  Musculoskeletal: Negative for back pain, neck pain and neck stiffness  Skin: Negative for rash and wound  Neurological: Positive for numbness (R hand)  Negative for dizziness, seizures, weakness, light-headedness and headaches  Physical Exam  Physical Exam  Vitals signs reviewed  Constitutional:       General: She is not in acute distress  Appearance: She is well-developed  She is not diaphoretic  HENT:      Head: Normocephalic and atraumatic  Eyes:      Conjunctiva/sclera: Conjunctivae normal    Neck:      Musculoskeletal: Normal range of motion  Pulmonary:      Effort: Pulmonary effort is normal  No respiratory distress  Musculoskeletal:         General: Tenderness (right chest wall near axilla  right hand) present  Comments: Decreased ROM to the R arm and R hand  Difficulty abduction at the hsoulder joint - this causes her some pain  Decreased function of the right hand, isolated, with pain with attempted movement of this hand  Normal cap refill  Pulses intact  Skin:     General: Skin is warm and dry  Coloration: Skin is not pale  Findings: No erythema or rash  Comments: No bruising, no erythema, no external skin changes, no wounds   Neurological:      Mental Status: She is alert and oriented to person, place, and time  Cranial Nerves: No cranial nerve deficit     Psychiatric:         Behavior: Behavior normal          Vital Signs  ED Triage Vitals   Temperature Pulse Respirations BP SpO2   08/01/20 1018 08/01/20 1018 08/01/20 1018 -- 08/01/20 1018   98 2 °F (36 8 °C) 96 19  99 %      Temp Source Heart Rate Source Patient Position - Orthostatic VS BP Location FiO2 (%)   08/01/20 1018 08/01/20 1018 08/01/20 1018 08/01/20 1018 --   Oral Monitor Sitting Right arm       Pain Score       08/01/20 1143       No Pain           Vitals:    08/01/20 1018   Pulse: 96   Patient Position - Orthostatic VS: Sitting         Visual Acuity      ED Medications  Medications   ketorolac (TORADOL) injection 30 mg (30 mg Intramuscular Given 8/1/20 1115)   predniSONE tablet 60 mg (60 mg Oral Given 8/1/20 1112)       Diagnostic Studies  Results Reviewed     None                 XR shoulder 2+ views RIGHT   ED Interpretation by Cass Rene DO (08/01 1235)   No acute osseous injury / dislocation      Final Result by Juan M Starr MD (08/01 1516)      No acute osseous abnormality  Workstation performed: [id number]         XR forearm 2 views RIGHT   ED Interpretation by Cass Rene DO (08/01 1235)   No acute osseous injury or dislocation      Final Result by Juan M Starr MD (08/01 1526)      No acute osseous abnormality  Workstation performed: [id number]                    Procedures  Orthopedic injury treatment    Date/Time: 8/1/2020 12:20 PM  Performed by: Cass Rene DO  Authorized by: Cass Rene DO     Patient Location:  ED  Verbal consent obtained?: Yes    Risks and benefits: Risks, benefits and alternatives were discussed    Consent given by:  Patient  Patient states understanding of procedure being performed: Yes    Relevant documents present and verified: Yes    Radiology Images displayed and confirmed   If images not available, report reviewed: Yes    Patient identity confirmed:  Verbally with patient  Neurovascular status: Neurovascularly intact    Distal perfusion: normal    Neurological function: normal    Range of motion: reduced    Immobilization:  Sling (right arm)  Supplies used:  Cotton padding and Ortho-Glass  Neurovascular status: Neurovascularly intact    Distal perfusion: normal    Neurological function: normal    Range of motion: normal    Range of motion comment: Splinted  Patient tolerance:  Patient tolerated the procedure well with no immediate complications             ED Course       US AUDIT      Most Recent Value   Initial Alcohol Screen: US AUDIT-C    1  How often do you have a drink containing alcohol?  0 Filed at: 08/01/2020 1019   2  How many drinks containing alcohol do you have on a typical day you are drinking? 0 Filed at: 08/01/2020 1019   3b  FEMALE Any Age, or MALE 65+: How often do you have 4 or more drinks on one occassion? 0 Filed at: 08/01/2020 1019   Audit-C Score  0 Filed at: 08/01/2020 1019                  GRANT/DAST-10      Most Recent Value   How many times in the past year have you    Used an illegal drug or used a prescription medication for non-medical reasons? Never Filed at: 08/01/2020 1019                                MDM  Number of Diagnoses or Management Options  Saturday night palsy, right:   Diagnosis management comments: Right arm palsy - story consistent w Saturday Night Palsy  Do not suspect stroke with this presentation  X Rays normal    Likely Saturday Night Palsy  Patient put in sling - advised to take arm out of sling and do circular motions multiple times daily to prevent frozen shoulder  Put on Steroids and antiinflammatory medication  Advised f/u neurology for further nerve follow up  Also advised follow up w PCP  Advised return to ED if needed for worsening condition, neurovascular insufficiency, fever/chills, skin discoloration, etc   Patient will follow up and agrees to return precautions  Discussed with patient and they understood the risks and benefits of discharge  Patient had opportunity to ask questions regarding care and discharge instructions and had no further questions  Advised follow up with PCP, advised returning if worsening, and discussed disease specific return precautions  Patient understood discharge instructions            Amount and/or Complexity of Data Reviewed  Tests in the radiology section of CPT®: ordered and reviewed          Disposition  Final diagnoses:   Saturday night palsy, right     Time reflects when diagnosis was documented in both MDM as applicable and the Disposition within this note     Time User Action Codes Description Comment    8/1/2020 12:43 PM Eddye Juancarlos Ryan [G56 31] Saturday night palsy, right       ED Disposition     ED Disposition Condition Date/Time Comment    Discharge Stable Sat Aug 1, 2020 12:42 PM Faith Lakhani discharge to home/self care  Follow-up Information     Follow up With Specialties Details Why Contact Info Additional Information    Janna Yarbrough MD  Schedule an appointment as soon as possible for a visit  For follow up to ensure improvement, and for further testing and treatment as needed [address] [facility] [address] Emergency Department Emergency Medicine  If symptoms worsen: worsening numbness, weakness, fever/chills, headache, neck pain, etc [facility] [address] ED, [address] [place], [place], [address], [place] Neurology Schedule an appointment as soon as possible for a visit  for neurology follow up [address] [place]   [phone]             Discharge Medication List as of 8/1/2020 12:47 PM      START taking these medications    Details   diclofenac sodium (VOLTAREN) 1 % Apply 2 g topically 4 (four) times a day Massage into the armpit, elbow, and wrist of affected extremity, Starting Sat 8/1/2020, Print      !! naproxen (NAPROSYN) 500 mg tablet Take 1 tablet (500 mg total) by mouth 2 (two) times a day with meals Take for 5 days scheduled    Then as needed for pain control , Starting Sat 8/1/2020, Normal      predniSONE 10 mg tablet Multiple Dosages:Starting Sat 8/1/2020, Last dose on Mon 8/3/2020, THEN Starting Tue 8/4/2020, Last dose on Thu 8/6/2020, THEN Starting Fri 8/7/2020, Last dose on Sat 8/8/2020, THEN Starting Sun 8/9/2020, Last dose on Mon 8/10/2020Take 6 tablets (60  mg total) by mouth daily for 3 days, THEN 4 tablets (40 mg total) daily for 3 days, THEN 2 tablets (20 mg total) daily for 2 days, THEN 1 tablet (10 mg total) daily for 2 days  , Print       !! - Potential duplicate medications found  Please discuss with provider        CONTINUE these medications which have NOT CHANGED    Details   Ascorbic Acid 500 MG CHEW 1 tab(s), Historical Med      azithromycin (ZITHROMAX) 500 MG tablet TAKE 1 TABLET (500 MG TOTAL) BY MOUTH DAILY FOR 2 DAYS , Historical Med      cholecalciferol (VITAMIN D3) 1,000 units tablet Take 1,000 Units by mouth daily, Historical Med      cycloSPORINE (RESTASIS) 0 05 % ophthalmic emulsion INSTILL 1 DROP INTO BOTH EYES EVERY TWELVE HOURS, Historical Med      !! dicyclomine (BENTYL) 20 mg tablet Starting Sun 12/9/2018, Historical Med      !! dicyclomine (BENTYL) 20 mg tablet Take 1 tablet (20 mg total) by mouth 2 (two) times a day, Starting Fri 11/15/2019, Print      fluconazole (DIFLUCAN) 150 mg tablet TAKE 1 TABLET BY MOUTH FOR ONE DOSE  TAKE BEFORE MENSES, Historical Med      fluticasone (FLONASE) 50 mcg/act nasal spray 1 spray into each nostril, Starting Tue 3/5/2019, Until Wed 3/4/2020, Historical Med      guaiFENesin (MUCINEX) 600 mg 12 hr tablet Take 2 tablets (1,200 mg total) by mouth every 12 (twelve) hours as needed for cough or congestion, Starting Sat 7/28/2018, Print      ibuprofen (MOTRIN) 800 mg tablet TAKE 1 TABLET BY MOUTH EVERY 8 HOURS AS NEEDED FOR MODERATE PAIN, Historical Med      MAGNESIUM PO 1, Historical Med      methocarbamol (ROBAXIN) 750 mg tablet Take 1 tablet by mouth 3 (three) times a day for 5 days, Starting Sun 11/5/2017, Until Fri 11/10/2017, Print      !! naproxen (NAPROSYN) 500 mg tablet Take 1 tablet (500 mg total) by mouth every 12 (twelve) hours as needed for mild pain or moderate pain, Starting Sat 7/28/2018, Print      oseltamivir (TAMIFLU) 75 mg capsule TAKE ONE CAPSULE BY MOUTH TWICE A DAY FOR 5 DAYS, Historical Med      polyethylene glycol (MIRALAX) 17 g packet Take 17 g by mouth daily Take twice daily in juice until you have a BM and then two more doses  , Starting Fri 11/15/2019, Print      predniSONE 10 MG (21) TBPK Take 10 mg by mouth, Starting Tue 3/5/2019, Historical Med      terconazole (TERAZOL 7) 0 4 % vaginal cream INSERT 1 APPLICATOR INTO THE VAGINA NIGHTLY FOR 7 DAYS , Historical Med      valACYclovir (VALTREX) 500 mg tablet 1 tab(s)prn, Historical Med      zinc gluconate 50 mg tablet Take 50 mg by mouth daily, Historical Med       !! - Potential duplicate medications found  Please discuss with provider  No discharge procedures on file      PDMP Review     None          ED Provider  Electronically Signed by           Sujata Ochoa DO  08/23/20 9619

## 2020-08-01 NOTE — DISCHARGE INSTRUCTIONS
Use the sling for comfort  Remove your arm from the sling multiple times a day and make circular motions to prevent frozen shoulder       Pinched / stretched nerve (Brachial Plexus) in the arm pit

## 2020-08-03 ENCOUNTER — TELEPHONE (OUTPATIENT)
Dept: NEUROLOGY | Facility: CLINIC | Age: 40
End: 2020-08-03

## 2020-08-03 NOTE — TELEPHONE ENCOUNTER
Best contact number for DGKOIIV:[phone]  Emergency Contact name and number:    Referring provider and telephone number:ER    Primary Care Provider Name and if affiliated with [facility]: Jessie Yang    Reason for Appointment/Dx:[place] Palsy  Neurology Location patient would like to be seen:    Order received? Yes                                                Records Received? No    Have you ever seen another Neurologist?       No    Insurance Information    Insurance Name:Danay PELAYO    ID/Policy #:    Secondary Insurance:    ID/Policy#: Workman's Comp/ Accident/ School  Information      Workman's Comp/Accident/School related?        No    If yes name of Insurance company:    Date of Injury:    Type of Injury:    [address] Name and Telephone Number:    Notes:Mufti Jacquie/New patient pack sent                   Appointment date: 08/11/20 1:00pm

## 2020-08-04 ENCOUNTER — TRANSCRIBE ORDERS (OUTPATIENT)
Dept: NEUROLOGY | Facility: CLINIC | Age: 40
End: 2020-08-04

## 2020-08-04 DIAGNOSIS — G51.0 BELL'S PALSY: Primary | ICD-10-CM

## 2020-09-01 ENCOUNTER — OFFICE VISIT (OUTPATIENT)
Dept: OBGYN CLINIC | Facility: CLINIC | Age: 40
End: 2020-09-01
Payer: COMMERCIAL

## 2020-09-01 VITALS
SYSTOLIC BLOOD PRESSURE: 110 MMHG | WEIGHT: 120 LBS | BODY MASS INDEX: 19.29 KG/M2 | DIASTOLIC BLOOD PRESSURE: 79 MMHG | TEMPERATURE: 99.1 F | HEIGHT: 66 IN | HEART RATE: 78 BPM

## 2020-09-01 DIAGNOSIS — S39.012A LUMBAR STRAIN, INITIAL ENCOUNTER: ICD-10-CM

## 2020-09-01 DIAGNOSIS — R51.9 INTERMITTENT HEADACHE: ICD-10-CM

## 2020-09-01 DIAGNOSIS — S16.1XXA CERVICAL STRAIN, INITIAL ENCOUNTER: ICD-10-CM

## 2020-09-01 DIAGNOSIS — M54.16 RADICULOPATHY, LUMBAR REGION: Primary | ICD-10-CM

## 2020-09-01 DIAGNOSIS — M62.838 CERVICAL PARASPINAL MUSCLE SPASM: ICD-10-CM

## 2020-09-01 DIAGNOSIS — R29.898 WEAKNESS OF BOTH HIPS: ICD-10-CM

## 2020-09-01 DIAGNOSIS — M51.26 BULGING LUMBAR DISC: ICD-10-CM

## 2020-09-01 DIAGNOSIS — M62.838 TRAPEZIUS MUSCLE SPASM: ICD-10-CM

## 2020-09-01 PROCEDURE — 99203 OFFICE O/P NEW LOW 30 MIN: CPT | Performed by: FAMILY MEDICINE

## 2020-09-01 NOTE — PROGRESS NOTES
Assessment/Plan:  Assessment/Plan   Diagnoses and all orders for this visit:    Radiculopathy, lumbar region  -     Ambulatory referral to Physical Therapy; Future    Lumbar strain, initial encounter  -     Ambulatory referral to Physical Therapy; Future    Weakness of both hips  -     Ambulatory referral to Physical Therapy; Future    Cervical strain, initial encounter  -     Ambulatory referral to Physical Therapy; Future    Cervical paraspinal muscle spasm  -     Ambulatory referral to Physical Therapy; Future    Trapezius muscle spasm  -     Ambulatory referral to Physical Therapy; Future    Bulging lumbar disc  -     Ambulatory referral to Physical Therapy; Future    Intermittent headache  -     Riboflavin 100 MG TABS; Take 2 tablets (200 mg total) by mouth 2 (two) times a day  -     magnesium oxide (MAG-OX) 400 mg; Take 1 tablet (400 mg total) by mouth 2 (two) times a day      72-year-old right-hand-dominant female with neck and low back pain of more than 6 months duration  Discussed with patient physical exam, imaging studies, impression and plan  X-rays of cervical spine resulted for being unremarkable  CT scan abdomen pelvis noted from mild disc bulging lumbar spine  Cervical spine is noted for bilateral paraspinal hypertonicity and tenderness  She has limited motion with extension and side bending to both sides  Lumbar spine is noted for bilateral paraspinal tenderness  She has limited range of motion with rotation and side bending to both sides  She has normal sensation and patellar reflex both lower extremities  She has weakness both hips with abduction  There is no groin pain with OMID and FADDIR maneuvers of the hips  Clinical impression that she is symptomatic from cervical and lumbar spine pathology and muscle strains  I discussed treatment regimen of continue with NSAIDs as needed, supplements, and physical therapy    She is to start taking tumeric 500 mg twice daily and tart cherry 1000 mg daily  She may apply topical Voltaren gel as needed  She is to start physical therapy as soon as possible and do home exercises as directed she may take riboflavin and magnesium for headaches  She will follow up in 8 weeks at which point she will be re-evaluated  Subjective:   Patient ID: Eliza Benítez is a 47 y o  female  Chief Complaint   Patient presents with    Neck - Pain    Lower Back - Pain       37-year-old right-hand-dominant female presents for evaluation of neck and low back pain of more than 6 months duration  She denies any particular trauma or inciting event  Pain described as gradual in onset, generalized to cervical lumbar spines, neck pain radiating to both shoulders, low back pain nonradiating, pain that is constant, achy and throbbing, worse with bending and twisting, worse when driving for prolonged durations, associated numbness and tingling both lower extremities, and improved with resting or changing position  She was started on gabapentin and advised on taking NSAIDs, however has not been taking medications consistently  She has also seen a chiropractor and had several sessions with temporary improvement  Back Pain   This is a chronic problem  The current episode started more than 1 month ago  The problem occurs constantly  The problem has been waxing and waning  Associated symptoms include arthralgias, numbness and weakness  Pertinent negatives include no abdominal pain, chest pain, chills, fever, joint swelling, rash or sore throat  The symptoms are aggravated by twisting and bending  She has tried rest, NSAIDs and position changes (Gabapentin, muscle relaxers) for the symptoms  The treatment provided mild relief  The following portions of the patient's history were reviewed and updated as appropriate: She  has a past medical history of Cholelithiasis    She  has a past surgical history that includes Colonoscopy and Combined abdominoplasty and liposuction (N/A)  Her family history includes Cancer in her father; No Known Problems in her mother  She  reports that she has been smoking cigarettes  She has been smoking about 0 50 packs per day  She uses smokeless tobacco  She reports current alcohol use  She reports that she does not use drugs  She is allergic to pollen extract       Review of Systems   Constitutional: Negative for chills and fever  HENT: Negative for sore throat  Eyes: Negative for visual disturbance  Respiratory: Negative for shortness of breath  Cardiovascular: Negative for chest pain  Gastrointestinal: Negative for abdominal pain  Genitourinary: Negative for flank pain  Musculoskeletal: Positive for arthralgias and back pain  Negative for joint swelling  Skin: Negative for rash and wound  Neurological: Positive for weakness and numbness  Hematological: Does not bruise/bleed easily  Psychiatric/Behavioral: Negative for self-injury  Objective:  Vitals:    09/01/20 0950   BP: 110/79   Pulse: 78   Temp: 99 1 °F (37 3 °C)   Weight: 54 4 kg (120 lb)   Height: 5' 6" (1 676 m)     Right Ankle Exam     Muscle Strength   Dorsiflexion:  5/5  Plantar flexion:  5/5      Left Ankle Exam     Muscle Strength   Dorsiflexion:  5/5   Plantar flexion:  5/5       Right Hip Exam     Muscle Strength   Abduction: 4/5   Flexion: 5/5     Tests   OMID: negative    Comments:  Negative FADDIR  Hamstring tightness      Left Hip Exam     Muscle Strength   Abduction: 4/5   Flexion: 5/5     Tests   OMID: negative    Comments:  Negative FADDIR  Hamstring tightness      Back Exam     Tenderness   The patient is experiencing tenderness in the cervical and lumbar      Range of Motion   Extension: normal   Flexion: normal   Lateral bend right: abnormal   Lateral bend left: abnormal   Rotation right: abnormal   Rotation left: abnormal     Muscle Strength   Right Quadriceps:  5/5   Left Quadriceps:  5/5     Tests   Straight leg raise right: negative  Straight leg raise left: negative    Reflexes   Patellar: normal          Strength/Myotome Testing     Left Ankle/Foot   Dorsiflexion: 5  Plantar flexion: 5    Right Ankle/Foot   Dorsiflexion: 5  Plantar flexion: 5      Physical Exam  Vitals signs and nursing note reviewed  Constitutional:       General: She is not in acute distress  Appearance: She is well-developed  HENT:      Head: Normocephalic  Eyes:      Conjunctiva/sclera: Conjunctivae normal    Neck:      Trachea: No tracheal deviation  Cardiovascular:      Rate and Rhythm: Normal rate  Pulmonary:      Effort: Pulmonary effort is normal  No respiratory distress  Abdominal:      General: There is no distension  Skin:     General: Skin is warm and dry  Neurological:      Mental Status: She is alert and oriented to person, place, and time  Psychiatric:         Behavior: Behavior normal          I have personally reviewed pertinent films in PACS and my interpretation is Lumbar disc bulging

## 2020-09-01 NOTE — LETTER
September 1, 2020     Azul Calle MD  [address] [facility] [address]    Patient: Aminah Cary   YOB: 1980   Date of Visit: 9/1/2020       Dear Dr Alberto Reasons: Thank you for referring Aminah Cary to me for evaluation  Below are my notes for this consultation  If you have questions, please do not hesitate to call me  I look forward to following your patient along with you  Sincerely,        [employer], DO        CC: No Recipients  [employer], DO  9/1/2020  7:49 PM  Sign when Signing Visit  Assessment/Plan:  Assessment/Plan   Diagnoses and all orders for this visit:    Radiculopathy, lumbar region  -     Ambulatory referral to Physical Therapy; Future    Lumbar strain, initial encounter  -     Ambulatory referral to Physical Therapy; Future    Weakness of both hips  -     Ambulatory referral to Physical Therapy; Future    Cervical strain, initial encounter  -     Ambulatory referral to Physical Therapy; Future    Cervical paraspinal muscle spasm  -     Ambulatory referral to Physical Therapy; Future    Trapezius muscle spasm  -     Ambulatory referral to Physical Therapy; Future    Bulging lumbar disc  -     Ambulatory referral to Physical Therapy; Future    Intermittent headache  -     Riboflavin 100 MG TABS; Take 2 tablets (200 mg total) by mouth 2 (two) times a day  -     magnesium oxide (MAG-OX) 400 mg; Take 1 tablet (400 mg total) by mouth 2 (two) times a day      72-year-old right-hand-dominant female with neck and low back pain of more than 6 months duration  Discussed with patient physical exam, imaging studies, impression and plan  X-rays of cervical spine resulted for being unremarkable  CT scan abdomen pelvis noted from mild disc bulging lumbar spine  Cervical spine is noted for bilateral paraspinal hypertonicity and tenderness  She has limited motion with extension and side bending to both sides  Lumbar spine is noted for bilateral paraspinal tenderness  She has limited range of motion with rotation and side bending to both sides  She has normal sensation and patellar reflex both lower extremities  She has weakness both hips with abduction  There is no groin pain with OMID and FADDIR maneuvers of the hips  Clinical impression that she is symptomatic from cervical and lumbar spine pathology and muscle strains  I discussed treatment regimen of continue with NSAIDs as needed, supplements, and physical therapy  She is to start taking tumeric 500 mg twice daily and tart cherry 1000 mg daily  She may apply topical Voltaren gel as needed  She is to start physical therapy as soon as possible and do home exercises as directed she may take riboflavin and magnesium for headaches  She will follow up in 8 weeks at which point she will be re-evaluated  Subjective:   Patient ID: Saul Waite is a 47 y o  female  Chief Complaint   Patient presents with    Neck - Pain    Lower Back - Pain       53-year-old right-hand-dominant female presents for evaluation of neck and low back pain of more than 6 months duration  She denies any particular trauma or inciting event  Pain described as gradual in onset, generalized to cervical lumbar spines, neck pain radiating to both shoulders, low back pain nonradiating, pain that is constant, achy and throbbing, worse with bending and twisting, worse when driving for prolonged durations, associated numbness and tingling both lower extremities, and improved with resting or changing position  She was started on gabapentin and advised on taking NSAIDs, however has not been taking medications consistently  She has also seen a chiropractor and had several sessions with temporary improvement  Back Pain   This is a chronic problem  The current episode started more than 1 month ago  The problem occurs constantly  The problem has been waxing and waning  Associated symptoms include arthralgias, numbness and weakness   Pertinent negatives include no abdominal pain, chest pain, chills, fever, joint swelling, rash or sore throat  The symptoms are aggravated by twisting and bending  She has tried rest, NSAIDs and position changes (Gabapentin, muscle relaxers) for the symptoms  The treatment provided mild relief  The following portions of the patient's history were reviewed and updated as appropriate: She  has a past medical history of Cholelithiasis  She  has a past surgical history that includes Colonoscopy and Combined abdominoplasty and liposuction (N/A)  Her family history includes Cancer in her father; No Known Problems in her mother  She  reports that she has been smoking cigarettes  She has been smoking about 0 50 packs per day  She uses smokeless tobacco  She reports current alcohol use  She reports that she does not use drugs  She is allergic to pollen extract       Review of Systems   Constitutional: Negative for chills and fever  HENT: Negative for sore throat  Eyes: Negative for visual disturbance  Respiratory: Negative for shortness of breath  Cardiovascular: Negative for chest pain  Gastrointestinal: Negative for abdominal pain  Genitourinary: Negative for flank pain  Musculoskeletal: Positive for arthralgias and back pain  Negative for joint swelling  Skin: Negative for rash and wound  Neurological: Positive for weakness and numbness  Hematological: Does not bruise/bleed easily  Psychiatric/Behavioral: Negative for self-injury         Objective:  Vitals:    09/01/20 0950   BP: 110/79   Pulse: 78   Temp: 99 1 °F (37 3 °C)   Weight: 54 4 kg (120 lb)   Height: 5' 6" (1 676 m)     Right Ankle Exam     Muscle Strength   Dorsiflexion:  5/5  Plantar flexion:  5/5      Left Ankle Exam     Muscle Strength   Dorsiflexion:  5/5   Plantar flexion:  5/5       Right Hip Exam     Muscle Strength   Abduction: 4/5   Flexion: 5/5     Tests   OMID: negative    Comments:  Negative FADDIR  Hamstring tightness      Left Hip Exam     Muscle Strength   Abduction: 4/5   Flexion: 5/5     Tests   OMID: negative    Comments:  Negative FADDIR  Hamstring tightness      Back Exam     Tenderness   The patient is experiencing tenderness in the cervical and lumbar  Range of Motion   Extension: normal   Flexion: normal   Lateral bend right: abnormal   Lateral bend left: abnormal   Rotation right: abnormal   Rotation left: abnormal     Muscle Strength   Right Quadriceps:  5/5   Left Quadriceps:  5/5     Tests   Straight leg raise right: negative  Straight leg raise left: negative    Reflexes   Patellar: normal          Strength/Myotome Testing     Left Ankle/Foot   Dorsiflexion: 5  Plantar flexion: 5    Right Ankle/Foot   Dorsiflexion: 5  Plantar flexion: 5      Physical Exam  Vitals signs and nursing note reviewed  Constitutional:       General: She is not in acute distress  Appearance: She is well-developed  HENT:      Head: Normocephalic  Eyes:      Conjunctiva/sclera: Conjunctivae normal    Neck:      Trachea: No tracheal deviation  Cardiovascular:      Rate and Rhythm: Normal rate  Pulmonary:      Effort: Pulmonary effort is normal  No respiratory distress  Abdominal:      General: There is no distension  Skin:     General: Skin is warm and dry  Neurological:      Mental Status: She is alert and oriented to person, place, and time  Psychiatric:         Behavior: Behavior normal          I have personally reviewed pertinent films in PACS and my interpretation is Lumbar disc bulging

## 2020-09-23 DIAGNOSIS — R51.9 INTERMITTENT HEADACHE: ICD-10-CM

## 2020-11-09 RX ORDER — MAGNESIUM OXIDE 400 MG/1
TABLET ORAL
Qty: 60 TABLET | Refills: 1 | OUTPATIENT
Start: 2020-11-09

## 2020-12-09 RX ORDER — MAGNESIUM OXIDE 400 MG/1
1 TABLET ORAL 2 TIMES DAILY
COMMUNITY
Start: 2020-09-01

## 2020-12-09 RX ORDER — NITROFURANTOIN 25; 75 MG/1; MG/1
CAPSULE ORAL
COMMUNITY
Start: 2020-09-14 | End: 2021-04-15

## 2020-12-09 RX ORDER — METRONIDAZOLE 500 MG/1
TABLET ORAL
COMMUNITY
Start: 2020-09-14 | End: 2021-04-15

## 2020-12-10 ENCOUNTER — OFFICE VISIT (OUTPATIENT)
Dept: GASTROENTEROLOGY | Facility: CLINIC | Age: 40
End: 2020-12-10
Payer: COMMERCIAL

## 2020-12-10 VITALS
SYSTOLIC BLOOD PRESSURE: 110 MMHG | BODY MASS INDEX: 19.49 KG/M2 | WEIGHT: 117 LBS | HEIGHT: 65 IN | DIASTOLIC BLOOD PRESSURE: 78 MMHG | HEART RATE: 94 BPM

## 2020-12-10 DIAGNOSIS — K58.2 IRRITABLE BOWEL SYNDROME WITH BOTH CONSTIPATION AND DIARRHEA: ICD-10-CM

## 2020-12-10 DIAGNOSIS — K21.9 GASTROESOPHAGEAL REFLUX DISEASE, UNSPECIFIED WHETHER ESOPHAGITIS PRESENT: Primary | ICD-10-CM

## 2020-12-10 PROCEDURE — 99213 OFFICE O/P EST LOW 20 MIN: CPT | Performed by: PHYSICIAN ASSISTANT

## 2020-12-10 RX ORDER — CLINDAMYCIN PHOSPHATE 20 MG/G
1 CREAM VAGINAL
COMMUNITY
Start: 2020-12-05 | End: 2020-12-12

## 2021-01-27 ENCOUNTER — OFFICE VISIT (OUTPATIENT)
Dept: GASTROENTEROLOGY | Facility: CLINIC | Age: 41
End: 2021-01-27
Payer: COMMERCIAL

## 2021-01-27 VITALS
WEIGHT: 122.8 LBS | HEIGHT: 65 IN | HEART RATE: 74 BPM | SYSTOLIC BLOOD PRESSURE: 98 MMHG | BODY MASS INDEX: 20.46 KG/M2 | DIASTOLIC BLOOD PRESSURE: 64 MMHG

## 2021-01-27 DIAGNOSIS — K21.9 GASTROESOPHAGEAL REFLUX DISEASE, UNSPECIFIED WHETHER ESOPHAGITIS PRESENT: ICD-10-CM

## 2021-01-27 DIAGNOSIS — B37.3 YEAST VAGINITIS: ICD-10-CM

## 2021-01-27 DIAGNOSIS — K62.89 ANAL OR RECTAL PAIN: Primary | ICD-10-CM

## 2021-01-27 PROCEDURE — 99213 OFFICE O/P EST LOW 20 MIN: CPT | Performed by: PHYSICIAN ASSISTANT

## 2021-01-27 RX ORDER — FLUCONAZOLE 150 MG/1
150 TABLET ORAL ONCE
Qty: 1 TABLET | Refills: 3 | Status: SHIPPED | OUTPATIENT
Start: 2021-01-27 | End: 2021-01-27

## 2021-01-27 RX ORDER — GABAPENTIN 100 MG/1
100 CAPSULE ORAL
COMMUNITY
Start: 2021-01-11 | End: 2022-02-16

## 2021-01-27 RX ORDER — DOXYCYCLINE HYCLATE 100 MG
100 TABLET ORAL 2 TIMES DAILY
COMMUNITY
Start: 2021-01-13 | End: 2021-02-03

## 2021-01-27 RX ORDER — HYDROCORTISONE 25 MG/G
CREAM TOPICAL 2 TIMES DAILY
Qty: 28 G | Refills: 0 | Status: SHIPPED | OUTPATIENT
Start: 2021-01-27 | End: 2021-11-22

## 2021-01-27 RX ORDER — BUSPIRONE HYDROCHLORIDE 5 MG/1
5 TABLET ORAL 2 TIMES DAILY
COMMUNITY
Start: 2021-01-11 | End: 2021-04-15

## 2021-01-27 RX ORDER — HYDROCORTISONE ACETATE 25 MG/1
25 SUPPOSITORY RECTAL
Qty: 14 SUPPOSITORY | Refills: 0 | Status: SHIPPED | OUTPATIENT
Start: 2021-01-27

## 2021-01-27 NOTE — PROGRESS NOTES
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  Will try suppositories    2  Gastroesophageal reflux disease, unspecified whether esophagitis present  Had been improving with Apple Cider Vinegar until recently when she was put on 2 abx courses for a UTI and for Lyme  She will continue the apple cider vinegar and use Pepcid prn  She wcb after the abx are completed - if still very symptomatic will plan EGD    ______________________________________________________________________    SUBJECTIVE:  65-year-old female with GERD and irritable bowel syndrome presents for follow-up  At her last visit she had been reporting heartburn and constipation  She did not want to go on any prescription medication and so she was advised to start taking apple cider vinegar daily  She reports that with 1 dose of this a day her symptoms improved significantly  Unfortunately, approximately 2 weeks ago she was started on antibiotics for UTI and then 1 week ago and antibiotic for Lyme  Since this her symptoms have been exacerbated  She also stopped her apple cider vinegar as she was not sure if she could take this with antibiotics  She denies any vomiting, hematemesis, dysphagia or melena  Her weight is stable  REVIEW OF SYSTEMS IS OTHERWISE NEGATIVE        Historical Information   Past Medical History:   Diagnosis Date    Cholelithiasis      Past Surgical History:   Procedure Laterality Date    COLONOSCOPY      less than 5 yrs ago    COMBINED ABDOMINOPLASTY AND LIPOSUCTION N/A      Social History   Social History     Substance and Sexual Activity   Alcohol Use Yes    Comment: occ     Social History     Substance and Sexual Activity   Drug Use No     Social History     Tobacco Use   Smoking Status Current Every Day Smoker    Packs/day: 0 50    Types: Cigarettes   Smokeless Tobacco Current User     Family History   Problem Relation Age of Onset    No Known Problems Mother     Cancer Father         large intestine       Meds/Allergies       Current Outpatient Medications:     Ascorbic Acid 500 MG CHEW    azithromycin (ZITHROMAX) 500 MG tablet    busPIRone (BUSPAR) 5 mg tablet    cholecalciferol (VITAMIN D3) 1,000 units tablet    cycloSPORINE (RESTASIS) 0 05 % ophthalmic emulsion    diclofenac sodium (VOLTAREN) 1 %    dicyclomine (BENTYL) 20 mg tablet    doxycycline hyclate (VIBRA-TABS) 100 mg tablet    fluconazole (DIFLUCAN) 150 mg tablet    gabapentin (NEURONTIN) 100 mg capsule    ibuprofen (MOTRIN) 800 mg tablet    magnesium oxide (MAG-OX) 400 mg tablet    magnesium oxide (MAG-OX) 400 mg    MAGNESIUM PO    metroNIDAZOLE (FLAGYL) 500 mg tablet    naproxen (NAPROSYN) 500 mg tablet    naproxen (NAPROSYN) 500 mg tablet    nitrofurantoin (MACROBID) 100 mg capsule    predniSONE 10 MG (21) TBPK    Riboflavin (VITAMIN B-2 PO)    Riboflavin 100 MG TABS    valACYclovir (VALTREX) 500 mg tablet    zinc gluconate 50 mg tablet    fluticasone (FLONASE) 50 mcg/act nasal spray    hydrocortisone (ANUSOL-HC) 2 5 % rectal cream    hydrocortisone (ANUSOL-HC) 25 mg suppository    methocarbamol (ROBAXIN) 750 mg tablet    Allergies   Allergen Reactions    Pollen Extract Itching     Sneezing, itching in the eyes           Objective     Blood pressure 98/64, pulse 74, height 5' 5" (1 651 m), weight 55 7 kg (122 lb 12 8 oz)  Body mass index is 20 43 kg/m²  PHYSICAL EXAM:      General Appearance:   Alert, cooperative, no distress   HEENT:   Normocephalic, atraumatic, anicteric      Neck:  Supple, symmetrical, trachea midline   Lungs:   Clear to auscultation bilaterally; no rales, rhonchi or wheezing; respirations unlabored    Heart[de-identified]   Regular rate and rhythm; no murmur, rub, or gallop     Abdomen:   Soft, non-tender, non-distended; normal bowel sounds; no masses, no organomegaly    Genitalia:   Deferred    Rectal:   Deferred    Extremities:  No cyanosis, clubbing or edema    Pulses:  2+ and symmetric    Skin:  No jaundice, rashes, or lesions    Lymph nodes:  No palpable cervical lymphadenopathy        Lab Results:   No visits with results within 1 Day(s) from this visit     Latest known visit with results is:   Admission on 11/14/2019, Discharged on 11/15/2019   Component Date Value    WBC 11/14/2019 7 90     RBC 11/14/2019 4 53     Hemoglobin 11/14/2019 14 1     Hematocrit 11/14/2019 42 6     MCV 11/14/2019 94     MCH 11/14/2019 31 1     MCHC 11/14/2019 33 1     RDW 11/14/2019 12 6     MPV 11/14/2019 11 2     Platelets 53/64/3322 192     nRBC 11/14/2019 0     Neutrophils Relative 11/14/2019 53     Immat GRANS % 11/14/2019 0     Lymphocytes Relative 11/14/2019 34     Monocytes Relative 11/14/2019 10     Eosinophils Relative 11/14/2019 2     Basophils Relative 11/14/2019 1     Neutrophils Absolute 11/14/2019 4 19     Immature Grans Absolute 11/14/2019 0 02     Lymphocytes Absolute 11/14/2019 2 72     Monocytes Absolute 11/14/2019 0 76     Eosinophils Absolute 11/14/2019 0 16     Basophils Absolute 11/14/2019 0 05     Sodium 11/14/2019 138     Potassium 11/14/2019 4 1     Chloride 11/14/2019 100     CO2 11/14/2019 28     ANION GAP 11/14/2019 10     BUN 11/14/2019 19     Creatinine 11/14/2019 0 82     Glucose 11/14/2019 92     Calcium 11/14/2019 9 1     eGFR 11/14/2019 92     Total Bilirubin 11/14/2019 0 30     Bilirubin, Direct 11/14/2019 0 08     Alkaline Phosphatase 11/14/2019 61     AST 11/14/2019 20     ALT 11/14/2019 24     Total Protein 11/14/2019 7 8     Albumin 11/14/2019 3 9     Lipase 11/14/2019 130     Color, UA 11/14/2019 Yellow     Clarity, UA 11/14/2019 Clear     Specific Gravity, UA 11/14/2019 1 010     pH, UA 11/14/2019 5 5     Leukocytes, UA 11/14/2019 Negative     Nitrite, UA 11/14/2019 Negative     Protein, UA 11/14/2019 Negative     Glucose, UA 11/14/2019 Negative     Ketones, UA 11/14/2019 Negative     Urobilinogen, UA 11/14/2019 0 2     Bilirubin, UA 11/14/2019 Negative     Blood, UA 11/14/2019 Negative     EXT PREG TEST UR (Ref: N* 11/15/2019 negative     Control 11/15/2019 valid          Radiology Results:   No results found

## 2021-03-26 ENCOUNTER — TELEPHONE (OUTPATIENT)
Dept: GASTROENTEROLOGY | Facility: CLINIC | Age: 41
End: 2021-03-26

## 2021-03-26 DIAGNOSIS — R10.9 ABDOMINAL PAIN, UNSPECIFIED ABDOMINAL LOCATION: Primary | ICD-10-CM

## 2021-03-26 NOTE — TELEPHONE ENCOUNTER
Magdy pt-  Patient is experiencing diarrhea and abdominal pain     She feels the bentyl is not working     Uses: [facility] [phone]  She has scheduled an office appt on 04/15 with Cici Segundo     Please phone [phone] to advise

## 2021-03-26 NOTE — TELEPHONE ENCOUNTER
Spoke with patient  History of GERD, IBS, anal pain    Patient c/o 2 days of diarrhea recently after taking organic supplements to have a BM,  and intermittent lower abdominal pain/cramping for 2 months  Patient has an old prescription for bentyl 20mg which has not been effective  +nausea, +fatigue  Denies black bloody stools, vomiting, fever, chills, SOB  She stopped taking apple cider vinegar 2 months ago  She will start pepcid 20mg BID, follow BRAT diet, encourage plenty of fluids  Patient is asking for another medication besides the bentyl  ANy suggestions?

## 2021-04-01 ENCOUNTER — TELEPHONE (OUTPATIENT)
Dept: GASTROENTEROLOGY | Facility: CLINIC | Age: 41
End: 2021-04-01

## 2021-04-12 RX ORDER — FLUCONAZOLE 150 MG/1
TABLET ORAL
COMMUNITY
Start: 2021-01-27 | End: 2021-05-04 | Stop reason: ALTCHOICE

## 2021-04-12 RX ORDER — NORETHINDRONE 0.35 MG/1
TABLET ORAL
COMMUNITY
Start: 2021-02-26 | End: 2021-04-15

## 2021-04-12 RX ORDER — AMOXICILLIN 500 MG/1
CAPSULE ORAL
COMMUNITY
Start: 2021-01-13 | End: 2021-04-15

## 2021-04-15 ENCOUNTER — OFFICE VISIT (OUTPATIENT)
Dept: GASTROENTEROLOGY | Facility: CLINIC | Age: 41
End: 2021-04-15
Payer: COMMERCIAL

## 2021-04-15 ENCOUNTER — PREP FOR PROCEDURE (OUTPATIENT)
Dept: GASTROENTEROLOGY | Facility: CLINIC | Age: 41
End: 2021-04-15

## 2021-04-15 ENCOUNTER — TELEPHONE (OUTPATIENT)
Dept: GASTROENTEROLOGY | Facility: CLINIC | Age: 41
End: 2021-04-15

## 2021-04-15 VITALS
HEIGHT: 65 IN | DIASTOLIC BLOOD PRESSURE: 70 MMHG | BODY MASS INDEX: 21.16 KG/M2 | SYSTOLIC BLOOD PRESSURE: 100 MMHG | WEIGHT: 127 LBS | HEART RATE: 54 BPM

## 2021-04-15 DIAGNOSIS — K21.9 GASTROESOPHAGEAL REFLUX DISEASE, UNSPECIFIED WHETHER ESOPHAGITIS PRESENT: ICD-10-CM

## 2021-04-15 DIAGNOSIS — K59.00 CONSTIPATION, UNSPECIFIED CONSTIPATION TYPE: Primary | ICD-10-CM

## 2021-04-15 PROCEDURE — 99213 OFFICE O/P EST LOW 20 MIN: CPT | Performed by: PHYSICIAN ASSISTANT

## 2021-04-15 NOTE — TELEPHONE ENCOUNTER
Juni Burnette pt-  Whatever medication was prescribed today is not covered by Cecil Gonzalez ) no name provided     Patient's cost would be $1000 00  Yrn Gonzalez   Please phone to advise [phone]

## 2021-04-15 NOTE — TELEPHONE ENCOUNTER
Lubiprostone 24MCG capsules has been rejected by insurance  No PA required for Lactulose 10 Gram/15 Ml Soln tier-1    Drug Name     PA Requirement  Lactulose 10 Gram/15 Ml Soln tier-1  NOT Required*  Amitiza 24 Mcg Cap tier-3, ST  Required*  Linzess 145 Mcg Cap tier-4, ST  Required*  Movantik 25 Mg Tab tier-6, QL, ST  Required*  Lactulose 20 Gram/30 Ml Soln tier-1  Required*  Lactulose 10 Gram/15 Ml (15 Ml) Soln tier-1 Required*  Trulance tier-5, ST    Required*  Symproic tier-7, ST    Required*  Relistor (Tab) tier-8, ST   Required*    What to try any of the above? Please advise  Thanks

## 2021-04-15 NOTE — PROGRESS NOTES
[address] [place] [address] [facility] - Outpatient Follow-up Note  Kyra Boudreaux 47 y o  female MRN: [id number]  Encounter: [id number]          ASSESSMENT AND PLAN:      1  Constipation, unspecified constipation type  Chronic and severe  Historically sh ehas tried to avoid rx medications  She knows she needs something - given script for Amitiza 24mcg BID with food  Reviewed high fiber and water diet  She is very active    She has a history of colon polyps and father who suffered with colon cancer  Last colonoscopy was in 2015 - will update screening at this time    2  Gastroesophageal reflux disease, unspecified whether esophagitis present  Symptoms are occasionally uncontrolled  She remains on Apple Cider vinegar  She is worried about HPYlori Recurrence - will repeat EGD  ______________________________________________________________________    SUBJECTIVE:    82-year-old female with constipation predominant irritable bowel syndrome and acid reflux presents for follow-up  She continues to struggle significantly with her bowel movements  She reports that if she does not take something she typically does not have a bowel movement  Historically she has relied on laxatives marilee Ellison She has always been resistant to taking a daily prescription medication  She is very concerned about developing diarrhea as a side effect  She reports abdominal pain and bloating  She had been on dicyclomine without any relief and so was switched to hyoscyamine which does work better for her pain  She is very active drinks lots of water and eats plenty of fiber in her diet  Her last colonoscopy was in 2015 reported as normal   She reports a history of colon polyps diagnosed in [place]  She states that her father suffered from colon cancer  She denies any rectal bleeding or unexpected weight loss  She remains on apple cider vinegar for her acid reflux which helps most of the time  She has occasional symptoms    She remains concerned about a possibility of H pylori recurrence  She denies any nausea, vomiting, dysphagia, hematemesis or melena  Her last upper endoscopy was in 2015  REVIEW OF SYSTEMS IS OTHERWISE NEGATIVE  Historical Information   Past Medical History:   Diagnosis Date    Cholelithiasis      Past Surgical History:   Procedure Laterality Date    COLONOSCOPY      less than 5 yrs ago    COMBINED ABDOMINOPLASTY AND LIPOSUCTION N/A      Social History   Social History     Substance and Sexual Activity   Alcohol Use Yes    Comment: occ     Social History     Substance and Sexual Activity   Drug Use No     Social History     Tobacco Use   Smoking Status Current Every Day Smoker    Packs/day: 0 50    Types: Cigarettes   Smokeless Tobacco Current User     Family History   Problem Relation Age of Onset    No Known Problems Mother     Cancer Father         large intestine       Meds/Allergies       Current Outpatient Medications:     Ascorbic Acid 500 MG CHEW    cholecalciferol (VITAMIN D3) 1,000 units tablet    cycloSPORINE (RESTASIS) 0 05 % ophthalmic emulsion    fluconazole (DIFLUCAN) 150 mg tablet    gabapentin (NEURONTIN) 100 mg capsule    hydrocortisone (ANUSOL-HC) 2 5 % rectal cream    hydrocortisone (ANUSOL-HC) 25 mg suppository    hyoscyamine (LEVSIN/SL) 0 125 mg SL tablet    ibuprofen (MOTRIN) 800 mg tablet    magnesium oxide (MAG-OX) 400 mg tablet    magnesium oxide (MAG-OX) 400 mg    naproxen (NAPROSYN) 500 mg tablet    naproxen (NAPROSYN) 500 mg tablet    Riboflavin (VITAMIN B-2 PO)    Riboflavin 100 MG TABS    valACYclovir (VALTREX) 500 mg tablet    zinc gluconate 50 mg tablet    fluticasone (FLONASE) 50 mcg/act nasal spray    lubiprostone (AMITIZA) 24 mcg capsule    Allergies   Allergen Reactions    Pollen Extract Itching     Sneezing, itching in the eyes           Objective     Blood pressure 100/70, pulse (!) 54, height 5' 5" (1 651 m), weight 57 6 kg (127 lb)   Body mass index is 21 13 kg/m²  PHYSICAL EXAM:      General Appearance:   Alert, cooperative, no distress   HEENT:   Normocephalic, atraumatic, anicteric      Neck:  Supple, symmetrical, trachea midline   Lungs:   Clear to auscultation bilaterally; no rales, rhonchi or wheezing; respirations unlabored    Heart[de-identified]   Regular rate and rhythm; no murmur, rub, or gallop  Abdomen:   Soft, non-tender, non-distended; normal bowel sounds; no masses, no organomegaly    Genitalia:   Deferred    Rectal:   Deferred    Extremities:  No cyanosis, clubbing or edema    Pulses:  2+ and symmetric    Skin:  No jaundice, rashes, or lesions    Lymph nodes:  No palpable cervical lymphadenopathy        Lab Results:   No visits with results within 1 Day(s) from this visit     Latest known visit with results is:   Admission on 11/14/2019, Discharged on 11/15/2019   Component Date Value    WBC 11/14/2019 7 90     RBC 11/14/2019 4 53     Hemoglobin 11/14/2019 14 1     Hematocrit 11/14/2019 42 6     MCV 11/14/2019 94     MCH 11/14/2019 31 1     MCHC 11/14/2019 33 1     RDW 11/14/2019 12 6     MPV 11/14/2019 11 2     Platelets 08/07/4075 192     nRBC 11/14/2019 0     Neutrophils Relative 11/14/2019 53     Immat GRANS % 11/14/2019 0     Lymphocytes Relative 11/14/2019 34     Monocytes Relative 11/14/2019 10     Eosinophils Relative 11/14/2019 2     Basophils Relative 11/14/2019 1     Neutrophils Absolute 11/14/2019 4 19     Immature Grans Absolute 11/14/2019 0 02     Lymphocytes Absolute 11/14/2019 2 72     Monocytes Absolute 11/14/2019 0 76     Eosinophils Absolute 11/14/2019 0 16     Basophils Absolute 11/14/2019 0 05     Sodium 11/14/2019 138     Potassium 11/14/2019 4 1     Chloride 11/14/2019 100     CO2 11/14/2019 28     ANION GAP 11/14/2019 10     BUN 11/14/2019 19     Creatinine 11/14/2019 0 82     Glucose 11/14/2019 92     Calcium 11/14/2019 9 1     eGFR 11/14/2019 92     Total Bilirubin 11/14/2019 0 30     Bilirubin, Direct 11/14/2019 0 08     Alkaline Phosphatase 11/14/2019 61     AST 11/14/2019 20     ALT 11/14/2019 24     Total Protein 11/14/2019 7 8     Albumin 11/14/2019 3 9     Lipase 11/14/2019 130     Color, UA 11/14/2019 Yellow     Clarity, UA 11/14/2019 Clear     Specific Gravity, UA 11/14/2019 1 010     pH, UA 11/14/2019 5 5     Leukocytes, UA 11/14/2019 Negative     Nitrite, UA 11/14/2019 Negative     Protein, UA 11/14/2019 Negative     Glucose, UA 11/14/2019 Negative     Ketones, UA 11/14/2019 Negative     Urobilinogen, UA 11/14/2019 0 2     Bilirubin, UA 11/14/2019 Negative     Blood, UA 11/14/2019 Negative     EXT PREG TEST UR (Ref: N* 11/15/2019 negative     Control 11/15/2019 valid          Radiology Results:   No results found

## 2021-04-19 ENCOUNTER — TELEPHONE (OUTPATIENT)
Dept: GASTROENTEROLOGY | Facility: CLINIC | Age: 41
End: 2021-04-19

## 2021-04-19 NOTE — TELEPHONE ENCOUNTER
Magdy pt-  RX; Amitiza 24 mcg needs to be prescribed     Lubiprostone will not be covered by patient's insurance     Please phone [phone] by 4 today so, the medication can be issued  Kimberly Profunmilayo

## 2021-04-19 NOTE — TELEPHONE ENCOUNTER
Carly Escalera from [facility] will need a return phone call re the medication matter     [phone]

## 2021-04-19 NOTE — TELEPHONE ENCOUNTER
Sent lubiprostone to covermymeds as an appeal and put in other medications used in addition to what was listed      KEY  [id number]

## 2021-04-20 NOTE — TELEPHONE ENCOUNTER
Patient needs to speak to you about a medication problem  [place] ) she did not know the name of the medication     Please phone [phone]

## 2021-04-21 NOTE — TELEPHONE ENCOUNTER
Called pt to see how we can help  Pt said insurance denied the [place] and asked if we can do a prior [address]  Told pt of course  Pt wants to know what she can take in the mean time because she really needs a med that will help her go to the bathroom but not give her diarrhea  Told pt that [place] approved the brand name only of Amitiza    Gave pt the phone number to [facility] from [facility] to call her at

## 2021-04-21 NOTE — TELEPHONE ENCOUNTER
Lida patient - Please return call to [phone] to discuss medication prescribed  Patient returning Lida's call   Thx

## 2021-04-23 ENCOUNTER — TELEPHONE (OUTPATIENT)
Dept: GASTROENTEROLOGY | Facility: CLINIC | Age: 41
End: 2021-04-23

## 2021-04-23 NOTE — TELEPHONE ENCOUNTER
Lida Patient - Patient cannot not confirm approval from [facility] for Meds and is quite upset as she cannot get a hold of [employer] to return call and she did not  Is there anything we can do   Thx

## 2021-04-23 NOTE — TELEPHONE ENCOUNTER
Called Jackie Wilkins to find out what the situation is     was advised that the pharmacy did not even run the medication and she will call pharmacy and call back

## 2021-04-23 NOTE — TELEPHONE ENCOUNTER
Marcelina Joseph patient Elmer Esquivel from [facility] [place] ([phone]) called to confirm that Amitiza was ordered and Isadora Lee the pharmacist should receive it Monday with a co-pay of $3   The mistake was that the generic was being used   Thx

## 2021-05-04 ENCOUNTER — HOSPITAL ENCOUNTER (OUTPATIENT)
Dept: GASTROENTEROLOGY | Facility: HOSPITAL | Age: 41
Setting detail: OUTPATIENT SURGERY
Discharge: HOME | End: 2021-05-04
Attending: INTERNAL MEDICINE
Payer: COMMERCIAL

## 2021-05-04 ENCOUNTER — ANESTHESIA (OUTPATIENT)
Dept: GASTROENTEROLOGY | Facility: HOSPITAL | Age: 41
End: 2021-05-04

## 2021-05-04 ENCOUNTER — ANESTHESIA EVENT (OUTPATIENT)
Dept: GASTROENTEROLOGY | Facility: HOSPITAL | Age: 41
End: 2021-05-04

## 2021-05-04 VITALS
TEMPERATURE: 98 F | DIASTOLIC BLOOD PRESSURE: 75 MMHG | HEIGHT: 65 IN | HEART RATE: 73 BPM | RESPIRATION RATE: 20 BRPM | BODY MASS INDEX: 20.68 KG/M2 | WEIGHT: 124.12 LBS | OXYGEN SATURATION: 100 % | SYSTOLIC BLOOD PRESSURE: 133 MMHG

## 2021-05-04 DIAGNOSIS — K21.9 GASTROESOPHAGEAL REFLUX DISEASE, UNSPECIFIED WHETHER ESOPHAGITIS PRESENT: ICD-10-CM

## 2021-05-04 DIAGNOSIS — K59.00 CONSTIPATION, UNSPECIFIED CONSTIPATION TYPE: ICD-10-CM

## 2021-05-04 LAB
EXT PREGNANCY TEST URINE: NEGATIVE
EXT. CONTROL: NORMAL

## 2021-05-04 PROCEDURE — 43239 EGD BIOPSY SINGLE/MULTIPLE: CPT | Performed by: INTERNAL MEDICINE

## 2021-05-04 PROCEDURE — 81025 URINE PREGNANCY TEST: CPT | Performed by: ANESTHESIOLOGY

## 2021-05-04 PROCEDURE — 45385 COLONOSCOPY W/LESION REMOVAL: CPT | Performed by: INTERNAL MEDICINE

## 2021-05-04 PROCEDURE — 88305 TISSUE EXAM BY PATHOLOGIST: CPT | Performed by: PATHOLOGY

## 2021-05-04 RX ORDER — SODIUM CHLORIDE, SODIUM LACTATE, POTASSIUM CHLORIDE, CALCIUM CHLORIDE 600; 310; 30; 20 MG/100ML; MG/100ML; MG/100ML; MG/100ML
INJECTION, SOLUTION INTRAVENOUS CONTINUOUS PRN
Status: DISCONTINUED | OUTPATIENT
Start: 2021-05-04 | End: 2021-05-04

## 2021-05-04 RX ORDER — PROPOFOL 10 MG/ML
INJECTION, EMULSION INTRAVENOUS AS NEEDED
Status: DISCONTINUED | OUTPATIENT
Start: 2021-05-04 | End: 2021-05-04

## 2021-05-04 RX ORDER — LIDOCAINE HYDROCHLORIDE 10 MG/ML
INJECTION, SOLUTION EPIDURAL; INFILTRATION; INTRACAUDAL; PERINEURAL AS NEEDED
Status: DISCONTINUED | OUTPATIENT
Start: 2021-05-04 | End: 2021-05-04

## 2021-05-04 RX ORDER — SODIUM CHLORIDE, SODIUM LACTATE, POTASSIUM CHLORIDE, CALCIUM CHLORIDE 600; 310; 30; 20 MG/100ML; MG/100ML; MG/100ML; MG/100ML
125 INJECTION, SOLUTION INTRAVENOUS CONTINUOUS
Status: DISCONTINUED | OUTPATIENT
Start: 2021-05-04 | End: 2021-05-08 | Stop reason: HOSPADM

## 2021-05-04 RX ADMIN — PROPOFOL 20 MG: 10 INJECTION, EMULSION INTRAVENOUS at 11:36

## 2021-05-04 RX ADMIN — PROPOFOL 40 MG: 10 INJECTION, EMULSION INTRAVENOUS at 11:34

## 2021-05-04 RX ADMIN — PROPOFOL 150 MG: 10 INJECTION, EMULSION INTRAVENOUS at 11:25

## 2021-05-04 RX ADMIN — PROPOFOL 20 MG: 10 INJECTION, EMULSION INTRAVENOUS at 11:27

## 2021-05-04 RX ADMIN — SODIUM CHLORIDE, SODIUM LACTATE, POTASSIUM CHLORIDE, AND CALCIUM CHLORIDE 125 ML/HR: .6; .31; .03; .02 INJECTION, SOLUTION INTRAVENOUS at 10:37

## 2021-05-04 RX ADMIN — PROPOFOL 20 MG: 10 INJECTION, EMULSION INTRAVENOUS at 11:40

## 2021-05-04 RX ADMIN — PROPOFOL 10 MG: 10 INJECTION, EMULSION INTRAVENOUS at 11:42

## 2021-05-04 RX ADMIN — PROPOFOL 20 MG: 10 INJECTION, EMULSION INTRAVENOUS at 11:31

## 2021-05-04 RX ADMIN — LIDOCAINE HYDROCHLORIDE 50 MG: 10 INJECTION, SOLUTION EPIDURAL; INFILTRATION; INTRACAUDAL; PERINEURAL at 11:21

## 2021-05-04 RX ADMIN — SODIUM CHLORIDE, SODIUM LACTATE, POTASSIUM CHLORIDE, AND CALCIUM CHLORIDE: .6; .31; .03; .02 INJECTION, SOLUTION INTRAVENOUS at 11:16

## 2021-05-04 RX ADMIN — PROPOFOL 20 MG: 10 INJECTION, EMULSION INTRAVENOUS at 11:29

## 2021-05-04 RX ADMIN — PROPOFOL 20 MG: 10 INJECTION, EMULSION INTRAVENOUS at 11:38

## 2021-05-04 NOTE — H&P
History and Physical - [facility]  Aminah Cary 47 y o  female MRN: [id number]      HPI: Aminah Cary is a 47y o  year old female who presents for evaluation of gastroesophageal reflux disease and constipation, personal history of Helicobacter pylori, family history for colon cancer      REVIEW OF SYSTEMS: Per the HPI, and otherwise unremarkable  Historical Information   Past Medical History:   Diagnosis Date    Cholelithiasis      Past Surgical History:   Procedure Laterality Date    COLONOSCOPY      less than 5 yrs ago    COMBINED ABDOMINOPLASTY AND LIPOSUCTION N/A      Social History   Social History     Substance and Sexual Activity   Alcohol Use Yes    Comment: occ     Social History     Substance and Sexual Activity   Drug Use No     Social History     Tobacco Use   Smoking Status Current Every Day Smoker    Packs/day: 0 50    Types: Cigarettes   Smokeless Tobacco Current User     Family History   Problem Relation Age of Onset    No Known Problems Mother     Cancer Father         large intestine       Meds/Allergies     (Not in a hospital admission)      Allergies   Allergen Reactions    Pollen Extract Itching     Sneezing, itching in the eyes       Objective     Blood pressure 114/74, pulse 76, temperature 97 5 °F (36 4 °C), temperature source Temporal, resp  rate 19, height 5' 5" (1 651 m), weight 56 3 kg (124 lb 1 9 oz), SpO2 97 %  PHYSICAL EXAM    Gen: NAD  CV: RRR  CHEST: Clear  ABD: soft, NT/ND  EXT: no edema      ASSESSMENT/PLAN:  This is a 47y o  year old female here for esophagogastroduodenoscopy, colonoscopy the, and she is stable and optimized for her procedure

## 2021-05-04 NOTE — ANESTHESIA POSTPROCEDURE EVALUATION
Post-Op Assessment Note    CV Status:  Stable    Pain management: adequate     Mental Status:  Sleepy   Hydration Status:  Euvolemic   PONV Controlled:  Controlled   Airway Patency:  Patent      Post Op Vitals Reviewed: Yes      Staff: CRNA         No complications documented      BP   97/60   Temp      Pulse  69   Resp   14   SpO2   98

## 2021-05-04 NOTE — ANESTHESIA PREPROCEDURE EVALUATION
Procedure:  COLONOSCOPY  EGD    Relevant Problems   No relevant active problems        Physical Exam    Airway    Mallampati score: I  TM Distance: >3 FB  Neck ROM: full     Dental       Cardiovascular  Rhythm: regular, Rate: normal, Cardiovascular exam normal    Pulmonary  Pulmonary exam normal Breath sounds clear to auscultation,     Other Findings        Anesthesia Plan  ASA Score- 1     Anesthesia Type-     Plan Factors-Exercise tolerance (METS): >4 METS  Chart reviewed  Patient is not a current smoker  Patient instructed to abstain from smoking on day of procedure  Patient did not smoke on day of surgery  Induction- intravenous  Postoperative Plan-     Informed Consent- Anesthetic plan and risks discussed with patient  I personally reviewed this patient with the CRNA  Discussed and agreed on the Anesthesia Plan with the CRNA  Lonny Carlin

## 2021-05-07 ENCOUNTER — TELEPHONE (OUTPATIENT)
Dept: GASTROENTEROLOGY | Facility: CLINIC | Age: 41
End: 2021-05-07

## 2021-05-10 ENCOUNTER — TELEPHONE (OUTPATIENT)
Dept: GASTROENTEROLOGY | Facility: CLINIC | Age: 41
End: 2021-05-10

## 2021-05-10 NOTE — TELEPHONE ENCOUNTER
----- Message from Mason Callow, DO sent at 5/10/2021  5:09 PM EDT -----  Please call the biopsy results to the patient  Biopsies the duodenum were benign and negative for celiac disease  Biopsies the stomach were benign and negative for Helicobacter pylori  There were 2 polyps in the colon, 1 was benign and the other was a precancerous adenoma  Both polyps were removed    The patient is due for repeat colonoscopy in 5 years

## 2021-07-22 DIAGNOSIS — K59.00 CONSTIPATION, UNSPECIFIED CONSTIPATION TYPE: ICD-10-CM

## 2021-07-22 RX ORDER — LUBIPROSTONE 24 UG/1
CAPSULE, GELATIN COATED ORAL
Qty: 180 CAPSULE | Refills: 1 | Status: SHIPPED | OUTPATIENT
Start: 2021-07-22

## 2021-07-30 ENCOUNTER — TELEPHONE (OUTPATIENT)
Dept: NEUROSURGERY | Facility: CLINIC | Age: 41
End: 2021-07-30

## 2021-08-05 ENCOUNTER — OFFICE VISIT (OUTPATIENT)
Dept: NEUROSURGERY | Facility: CLINIC | Age: 41
End: 2021-08-05
Payer: COMMERCIAL

## 2021-08-05 VITALS
WEIGHT: 124 LBS | RESPIRATION RATE: 16 BRPM | BODY MASS INDEX: 20.66 KG/M2 | HEIGHT: 65 IN | TEMPERATURE: 97.6 F | DIASTOLIC BLOOD PRESSURE: 78 MMHG | HEART RATE: 76 BPM | SYSTOLIC BLOOD PRESSURE: 114 MMHG

## 2021-08-05 DIAGNOSIS — Q34.9 THORACIC CYST: Primary | ICD-10-CM

## 2021-08-05 DIAGNOSIS — M54.6 THORACIC BACK PAIN, UNSPECIFIED BACK PAIN LATERALITY, UNSPECIFIED CHRONICITY: ICD-10-CM

## 2021-08-05 PROCEDURE — 99203 OFFICE O/P NEW LOW 30 MIN: CPT | Performed by: NEUROLOGICAL SURGERY

## 2021-08-05 NOTE — PROGRESS NOTES
Assessment/Plan:    No problem-specific Assessment & Plan notes found for this encounter  Problem List Items Addressed This Visit     None      Visit Diagnoses     Thoracic cyst    -  Primary    Relevant Orders    Ambulatory referral to Neurosurgery    Thoracic back pain, unspecified back pain laterality, unspecified chronicity        Relevant Orders    Ambulatory referral to Neurosurgery            Subjective:      Patient ID: Rocío Corrales is a 46 y o  female  HPI    The following portions of the patient's history were reviewed and updated as appropriate:   She  has a past medical history of Cholelithiasis and Colon polyp  She There are no problems to display for this patient  She  has a past surgical history that includes Colonoscopy and Combined abdominoplasty and liposuction (N/A)  Her family history includes Cancer in her father; No Known Problems in her mother  She  reports that she quit smoking about 7 months ago  Her smoking use included cigarettes  She smoked 0 50 packs per day  She uses smokeless tobacco  She reports current alcohol use  She reports that she does not use drugs    Current Outpatient Medications   Medication Sig Dispense Refill    Ascorbic Acid 500 MG CHEW 1 tab(s)      cholecalciferol (VITAMIN D3) 1,000 units tablet Take 1,000 Units by mouth daily      cycloSPORINE (RESTASIS) 0 05 % ophthalmic emulsion INSTILL 1 DROP INTO BOTH EYES EVERY TWELVE HOURS      gabapentin (NEURONTIN) 100 mg capsule Take 100 mg by mouth      magnesium oxide (MAG-OX) 400 mg tablet Take 1 tablet by mouth 2 (two) times a day      zinc gluconate 50 mg tablet Take 50 mg by mouth daily      Amitiza 24 MCG capsule TAKE 1 CAPSULE (24 MCG TOTAL) BY MOUTH 2 (TWO) TIMES A DAY WITH MEALS 180 capsule 1    fluticasone (FLONASE) 50 mcg/act nasal spray 1 spray into each nostril      hydrocortisone (ANUSOL-HC) 2 5 % rectal cream Apply topically 2 (two) times a day (Patient not taking: Reported on 8/5/2021) 28 g 0    hydrocortisone (ANUSOL-HC) 25 mg suppository Insert 1 suppository (25 mg total) into the rectum daily at bedtime (Patient not taking: Reported on 8/5/2021) 14 suppository 0    hyoscyamine (LEVSIN/SL) 0 125 mg SL tablet Take 1 tablet (0 125 mg total) by mouth every 6 (six) hours as needed for cramping (Patient not taking: Reported on 8/5/2021) 45 tablet 1    ibuprofen (MOTRIN) 800 mg tablet TAKE 1 TABLET BY MOUTH EVERY 8 HOURS AS NEEDED FOR MODERATE PAIN (Patient not taking: Reported on 8/5/2021)  0    magnesium oxide (MAG-OX) 400 mg Take 1 tablet (400 mg total) by mouth 2 (two) times a day (Patient not taking: Reported on 8/5/2021) 60 tablet 1    naproxen (NAPROSYN) 500 mg tablet Take 1 tablet (500 mg total) by mouth every 12 (twelve) hours as needed for mild pain or moderate pain (Patient not taking: Reported on 8/5/2021) 20 tablet 0    naproxen (NAPROSYN) 500 mg tablet Take 1 tablet (500 mg total) by mouth 2 (two) times a day with meals Take for 5 days scheduled  Then as needed for pain control  (Patient not taking: Reported on 8/5/2021) 30 tablet 0    Riboflavin (VITAMIN B-2 PO) TAKE 2 TABLETS (200 MG TOTAL) BY MOUTH 2 (TWO) TIMES A DAY (Patient not taking: Reported on 8/5/2021)      Riboflavin 100 MG TABS Take 2 tablets (200 mg total) by mouth 2 (two) times a day (Patient not taking: Reported on 8/5/2021) 120 tablet 1    valACYclovir (VALTREX) 500 mg tablet 1 tab(s)prn (Patient not taking: Reported on 8/5/2021)       No current facility-administered medications for this visit       Current Outpatient Medications on File Prior to Visit   Medication Sig    Ascorbic Acid 500 MG CHEW 1 tab(s)    cholecalciferol (VITAMIN D3) 1,000 units tablet Take 1,000 Units by mouth daily    cycloSPORINE (RESTASIS) 0 05 % ophthalmic emulsion INSTILL 1 DROP INTO BOTH EYES EVERY TWELVE HOURS    gabapentin (NEURONTIN) 100 mg capsule Take 100 mg by mouth    magnesium oxide (MAG-OX) 400 mg tablet Take 1 tablet by mouth 2 (two) times a day    zinc gluconate 50 mg tablet Take 50 mg by mouth daily    Amitiza 24 MCG capsule TAKE 1 CAPSULE (24 MCG TOTAL) BY MOUTH 2 (TWO) TIMES A DAY WITH MEALS    fluticasone (FLONASE) 50 mcg/act nasal spray 1 spray into each nostril    hydrocortisone (ANUSOL-HC) 2 5 % rectal cream Apply topically 2 (two) times a day (Patient not taking: Reported on 8/5/2021)    hydrocortisone (ANUSOL-HC) 25 mg suppository Insert 1 suppository (25 mg total) into the rectum daily at bedtime (Patient not taking: Reported on 8/5/2021)    hyoscyamine (LEVSIN/SL) 0 125 mg SL tablet Take 1 tablet (0 125 mg total) by mouth every 6 (six) hours as needed for cramping (Patient not taking: Reported on 8/5/2021)    ibuprofen (MOTRIN) 800 mg tablet TAKE 1 TABLET BY MOUTH EVERY 8 HOURS AS NEEDED FOR MODERATE PAIN (Patient not taking: Reported on 8/5/2021)    magnesium oxide (MAG-OX) 400 mg Take 1 tablet (400 mg total) by mouth 2 (two) times a day (Patient not taking: Reported on 8/5/2021)    naproxen (NAPROSYN) 500 mg tablet Take 1 tablet (500 mg total) by mouth every 12 (twelve) hours as needed for mild pain or moderate pain (Patient not taking: Reported on 8/5/2021)    naproxen (NAPROSYN) 500 mg tablet Take 1 tablet (500 mg total) by mouth 2 (two) times a day with meals Take for 5 days scheduled  Then as needed for pain control  (Patient not taking: Reported on 8/5/2021)    Riboflavin (VITAMIN B-2 PO) TAKE 2 TABLETS (200 MG TOTAL) BY MOUTH 2 (TWO) TIMES A DAY (Patient not taking: Reported on 8/5/2021)    Riboflavin 100 MG TABS Take 2 tablets (200 mg total) by mouth 2 (two) times a day (Patient not taking: Reported on 8/5/2021)    valACYclovir (VALTREX) 500 mg tablet 1 tab(s)prn (Patient not taking: Reported on 8/5/2021)     No current facility-administered medications on file prior to visit  She is allergic to pollen extract       Review of Systems   Constitutional: Negative  HENT: Negative      Eyes: Negative  Respiratory: Negative  Cardiovascular: Negative  Gastrointestinal: Negative  Endocrine: Negative  Genitourinary: Positive for frequency  Musculoskeletal: Positive for back pain (mid back pain)  PT/STEFANI: none     Currently sees chiropractor     No pain meds    4-5/10n midback pain worse with bending    Skin: Negative  Neurological: Positive for weakness (hands and leg ), numbness (hands and legs) and headaches  Negative for dizziness  Hematological: Negative  Psychiatric/Behavioral: Negative  Objective: There were no vitals taken for this visit  Physical Exam      I have personally obtain history and examined patient  I have personally reviewed case including all pertinent investigations/studies  Time spent 60 minutes  More than 50% of total time spent on counseling and coordination of care as described above including patient education, discussion of risks and rationale of conservative vs surgical treatment options  HPI    Chronic midline low thoracic pain, denies weakness of numbness  MRI from [id number] demonstrates spinal cyst with apparent enlargement  Exam    Full strength bilateral LE  Normal sensation  Normal DTR  Normal gait  Normal ROM  Normal tandem  Percussion tenderness T10/11 region  No tenderness overlying cervicothoracic region    Radiology    MRI 06/2021, previous MRI not available on PACS    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

## 2021-08-10 ENCOUNTER — OFFICE VISIT (OUTPATIENT)
Dept: NEUROSURGERY | Facility: CLINIC | Age: 41
End: 2021-08-10
Payer: COMMERCIAL

## 2021-08-10 ENCOUNTER — TELEPHONE (OUTPATIENT)
Dept: NEUROSURGERY | Facility: CLINIC | Age: 41
End: 2021-08-10

## 2021-08-10 VITALS
RESPIRATION RATE: 16 BRPM | SYSTOLIC BLOOD PRESSURE: 118 MMHG | HEIGHT: 65 IN | BODY MASS INDEX: 21.49 KG/M2 | TEMPERATURE: 98.1 F | WEIGHT: 129 LBS | DIASTOLIC BLOOD PRESSURE: 78 MMHG

## 2021-08-10 DIAGNOSIS — M54.6 THORACIC BACK PAIN, UNSPECIFIED BACK PAIN LATERALITY, UNSPECIFIED CHRONICITY: ICD-10-CM

## 2021-08-10 DIAGNOSIS — Z86.19 HISTORY OF LYME DISEASE: ICD-10-CM

## 2021-08-10 DIAGNOSIS — M79.662 PAIN OF LEFT KNEE AND LOWER LEG: ICD-10-CM

## 2021-08-10 DIAGNOSIS — M25.50 ARTHRALGIA, UNSPECIFIED JOINT: ICD-10-CM

## 2021-08-10 DIAGNOSIS — R20.0 NUMBNESS AND TINGLING IN BOTH HANDS: ICD-10-CM

## 2021-08-10 DIAGNOSIS — Q34.9 THORACIC CYST: Primary | ICD-10-CM

## 2021-08-10 DIAGNOSIS — R20.2 NUMBNESS AND TINGLING IN BOTH HANDS: ICD-10-CM

## 2021-08-10 DIAGNOSIS — M25.562 PAIN OF LEFT KNEE AND LOWER LEG: ICD-10-CM

## 2021-08-10 PROBLEM — IMO0002 THORACIC CYST: Status: ACTIVE | Noted: 2021-08-10

## 2021-08-10 PROCEDURE — 99213 OFFICE O/P EST LOW 20 MIN: CPT | Performed by: PHYSICIAN ASSISTANT

## 2021-08-10 NOTE — PROGRESS NOTES
Patient ID: Blanca Smart is a 46 y o  female  Diagnoses and all orders for this visit:    Thoracic cyst    Pain of left knee and lower leg    Numbness and tingling in both hands    History of Lyme disease    Arthralgia, unspecified joint          Assessment/Plan:    Very pleasant 74-year-old female, accompanied by her , returns to review MRI thoracic spine  She had a history of midback pain, as a consequence MRI of the thoracic spine was ordered  They make note of a cystic like lesion, T4-T5 area laterally to the left not displacing the cord  At visit today she has no specific complaint of thoracic pain although she reports she has pain in multiple joints which vary dated day  She also has specific complaint of pain left leg knee to her foot, present for the last 1-2 weeks  She also has complaint of numbness and tingling of her hands and forearms bilaterally present for approximately 1 month  She has a history of Lyme disease which she reports may have gone untreated for about 7 or 8 months, she has since taken a course of antibiotics on Dr Ronn Khan, PCP [place], [place] area  She reports no myelopathic signs such as difficulty with buttons, difficulty with zippers, dropping things, difficulty managing knife and fork to feed herself, falls or near falls, urinary incontinence  MRI thoracic spine 4/29/21 and 6/16/20 (studies from [facility]) when her very carefully reviewed in detail by Dr Brandie Calderon and over the interval the studies there is no change in this approximately 18 x 8 mm cystic lesion  As noted it is in the epidural space, and neural foramen on the left T4 through T5 mildly indents the underlying dura has no impact on the cord  [facility] was recently in contacted and evidently there is a study from 2018 the patient does not specifically recall this  The study has been requested by [employer] and should be available for review/comparison tomorrow      On examination today she is awake, alert, oriented x3, there is no pronator drift, rapid alternating movements are intact, finger-nose is intact, cranial nerves are grossly intact, in a standing position she could easily lift on heel and toes, she can bend at the waist and touch her toes  Motor exam of the upper lower extremities is 5 x 5 for power throughout, reflexes triceps, biceps, brachioradialis, patella Achilles are +2 and symmetric, sensation sharp, dull and vibratory and were intact throughout gait balance was unremarkable  At this juncture the patient will be contacted by phone ([phone]) with findings when studies are reviewed/compared  Based on the findings follow-up will be on a as needed basis unless of course there is some change noted in the cystic mass then a repeat MRI in approximately 6 months to assess for any further changes planned  She is encouraged to continue following  with Dr Lelo Longoria relative to her ongoing symptoms associated with Lyme exposure  These findings, impressions and recommendations were reviewed in great detail the patient and her , they expressed understanding and agreement  The questions were answered completely into their satisfaction  They are agreement with the plan  Return if symptoms worsen or fail to improve, for Pending review of prior images, 2018  Chief Complaint  Returns to review abnormal MRI findings thoracic spine, has left leg pain below the knee, numbness and tingling of both hands and forearms  HPI       The following portions of the patient's history were reviewed and updated as appropriate: allergies, current medications, past family history, past medical history, past social history, past surgical history and problem list     Review of Systems   Constitutional: Negative  HENT: Negative  Eyes: Negative  Respiratory: Negative  Cardiovascular: Negative  Gastrointestinal: Negative  Endocrine: Negative      Genitourinary: Positive for frequency  Musculoskeletal: Positive for back pain (mid and upper back pain)  PT/STEFANI: none     Currently sees chiropractor     No pain meds    4-5/10n midback pain worse with bending    Skin: Negative  Neurological: Positive for weakness (hands and leg ), numbness (hands and left leg knee down-worse at night) and headaches  Negative for dizziness  Hematological: Negative  Psychiatric/Behavioral: Negative  Objective:    Physical Exam  Constitutional:       Appearance: She is well-developed  HENT:      Head: Normocephalic and atraumatic  Eyes:      Pupils: Pupils are equal, round, and reactive to light  Cardiovascular:      Rate and Rhythm: Normal rate  Pulmonary:      Effort: Pulmonary effort is normal       Breath sounds: Normal breath sounds  Skin:     General: Skin is warm and dry  Neurological:      Mental Status: She is alert and oriented to person, place, and time  Neurologic Exam     Mental Status   Oriented to person, place, and time  Cranial Nerves     CN III, IV, VI   Pupils are equal, round, and reactive to light

## 2021-08-10 NOTE — TELEPHONE ENCOUNTER
Ellie Irvin from Radiology and he contacted the radiology reading room staff at [facility] and they were able to identify a thoracic study from 2018  They will have it couriered over and Samir Akers should have it by tomorrow  I let patient and AMI Mooney know

## 2021-08-10 NOTE — PATIENT INSTRUCTIONS
Continue to follow with Dr Monique Elliott relative to year post Lyme disease symptoms  Await phone call ([phone])relative to the review of your MRI thoracic spine 2018   (study was performed at [facility])

## 2021-08-13 DIAGNOSIS — Q34.9 THORACIC CYST: Primary | ICD-10-CM

## 2021-08-13 NOTE — PROGRESS NOTES
A thorough search of [facility] records, unable to locate a MRI thoracic spine from prior to 2001 (query was for 2016)    A study in 6 months MRI thoracic spine with and without contrast to evaluate for a cystic lesion T$-T5 laterally to the left

## 2021-08-16 ENCOUNTER — TELEPHONE (OUTPATIENT)
Dept: NEUROSURGERY | Facility: CLINIC | Age: 41
End: 2021-08-16

## 2021-08-16 NOTE — TELEPHONE ENCOUNTER
----- Message from Amari Loco PA-C sent at 8/13/2021  2:19 PM EDT -----  As we were not able to locate a MRI of the thoracic spine from a 2016, or other from [facility], further imaging is necessary to establish stability of her intrathoracic cyst   MRI thoracic spine with and without contrast in 6 months      [facility]

## 2021-08-16 NOTE — TELEPHONE ENCOUNTER
Attempted to reach patient to inform her of this with no answer  Left her a detailed message with central scheduling's phone number  She is to call back once it's scheduled so we can be aware for authorization purposes but also so we can schedule the follow up after imaging to discuss the results  no

## 2021-11-22 ENCOUNTER — OFFICE VISIT (OUTPATIENT)
Dept: GASTROENTEROLOGY | Facility: CLINIC | Age: 41
End: 2021-11-22
Payer: COMMERCIAL

## 2021-11-22 VITALS
HEART RATE: 74 BPM | BODY MASS INDEX: 20.99 KG/M2 | DIASTOLIC BLOOD PRESSURE: 72 MMHG | SYSTOLIC BLOOD PRESSURE: 98 MMHG | HEIGHT: 65 IN | WEIGHT: 126 LBS

## 2021-11-22 DIAGNOSIS — R11.0 NAUSEA: ICD-10-CM

## 2021-11-22 DIAGNOSIS — K82.9 GALLBLADDER DISEASE: ICD-10-CM

## 2021-11-22 DIAGNOSIS — K21.9 GASTROESOPHAGEAL REFLUX DISEASE, UNSPECIFIED WHETHER ESOPHAGITIS PRESENT: Primary | ICD-10-CM

## 2021-11-22 DIAGNOSIS — K58.1 IRRITABLE BOWEL SYNDROME WITH CONSTIPATION: ICD-10-CM

## 2021-11-22 PROCEDURE — 99213 OFFICE O/P EST LOW 20 MIN: CPT | Performed by: PHYSICIAN ASSISTANT

## 2021-11-22 RX ORDER — PANTOPRAZOLE SODIUM 40 MG/1
40 TABLET, DELAYED RELEASE ORAL DAILY
Qty: 30 TABLET | Refills: 0 | Status: SHIPPED | OUTPATIENT
Start: 2021-11-22 | End: 2021-12-15

## 2021-11-22 RX ORDER — FLUCONAZOLE 150 MG/1
TABLET ORAL
COMMUNITY
Start: 2021-09-28

## 2021-12-04 ENCOUNTER — HOSPITAL ENCOUNTER (OUTPATIENT)
Dept: ULTRASOUND IMAGING | Facility: HOSPITAL | Age: 41
Discharge: HOME | End: 2021-12-04
Payer: COMMERCIAL

## 2021-12-04 DIAGNOSIS — K82.9 GALLBLADDER DISEASE: ICD-10-CM

## 2021-12-04 PROCEDURE — 76700 US EXAM ABDOM COMPLETE: CPT

## 2021-12-09 ENCOUNTER — TELEPHONE (OUTPATIENT)
Dept: GASTROENTEROLOGY | Facility: CLINIC | Age: 41
End: 2021-12-09

## 2021-12-15 DIAGNOSIS — K21.9 GASTROESOPHAGEAL REFLUX DISEASE, UNSPECIFIED WHETHER ESOPHAGITIS PRESENT: ICD-10-CM

## 2021-12-15 RX ORDER — PANTOPRAZOLE SODIUM 40 MG/1
TABLET, DELAYED RELEASE ORAL
Qty: 30 TABLET | Refills: 0 | Status: SHIPPED | OUTPATIENT
Start: 2021-12-15 | End: 2022-01-05 | Stop reason: SDUPTHER

## 2022-01-05 ENCOUNTER — OFFICE VISIT (OUTPATIENT)
Dept: GASTROENTEROLOGY | Facility: CLINIC | Age: 42
End: 2022-01-05
Payer: COMMERCIAL

## 2022-01-05 VITALS
BODY MASS INDEX: 21.16 KG/M2 | HEIGHT: 65 IN | DIASTOLIC BLOOD PRESSURE: 80 MMHG | WEIGHT: 127 LBS | SYSTOLIC BLOOD PRESSURE: 102 MMHG

## 2022-01-05 DIAGNOSIS — K59.00 CONSTIPATION, UNSPECIFIED CONSTIPATION TYPE: ICD-10-CM

## 2022-01-05 DIAGNOSIS — K21.9 GASTROESOPHAGEAL REFLUX DISEASE, UNSPECIFIED WHETHER ESOPHAGITIS PRESENT: Primary | ICD-10-CM

## 2022-01-05 PROCEDURE — 99213 OFFICE O/P EST LOW 20 MIN: CPT | Performed by: PHYSICIAN ASSISTANT

## 2022-01-05 RX ORDER — PANTOPRAZOLE SODIUM 40 MG/1
40 TABLET, DELAYED RELEASE ORAL 2 TIMES DAILY
Qty: 60 TABLET | Refills: 3 | Status: SHIPPED | OUTPATIENT
Start: 2022-01-05

## 2022-01-05 NOTE — PROGRESS NOTES
[facility] - Outpatient Follow-up Note  Jorge Watkins 46 y o  female MRN: [id number]  Encounter: [id number]          ASSESSMENT AND PLAN:      1  Gastroesophageal reflux disease, unspecified whether esophagitis present  Symptoms have improved on Pantoprazole 40mg daily but have not resolved  Temporarily increase to BID as main symptom is globus sensation  Then drop to once daily again with a Pepcid qhs  EGD 5/2021 was normal    2  Constipation, unspecified constipation type  She has not started Amitiza as she is taking an herbal supplement for her Lyme - she will finish this next week and then start Amitiza    ______________________________________________________________________    SUBJECTIVE:  42-year-old female with GERD and constipation predominant irritable bowel syndrome presents for follow-up  After her last visit she started to take pantoprazole 40 mg once daily  This has helped her symptoms but has not completely resolved them  Her heartburn is better but she continues to report a globus sensation  She also has occasional dysphagia to foods  She continues with constipation  She admits that she has not started taking her Amitiza as she is taking an herbal supplement for her Lyme disease as given to her by her PCP  She did not want to take 2 to things at once  REVIEW OF SYSTEMS IS OTHERWISE NEGATIVE        Historical Information   Past Medical History:   Diagnosis Date    Cholelithiasis     Colon polyp      Past Surgical History:   Procedure Laterality Date    COLONOSCOPY      less than 5 yrs ago    COMBINED ABDOMINOPLASTY AND LIPOSUCTION N/A      Social History   Social History     Substance and Sexual Activity   Alcohol Use Yes    Comment: occ     Social History     Substance and Sexual Activity   Drug Use No     Social History     Tobacco Use   Smoking Status Former Smoker    Packs/day: 0 50    Types: Cigarettes    Quit date: 1/1/2021    Years since quitting: 1 0   Smokeless Tobacco Never Used   Tobacco Comment    vapes     Family History   Problem Relation Age of Onset    No Known Problems Mother     Cancer Father         large intestine       Meds/Allergies       Current Outpatient Medications:     Amitiza 24 MCG capsule    Ascorbic Acid 500 MG CHEW    cholecalciferol (VITAMIN D3) 1,000 units tablet    cycloSPORINE (RESTASIS) 0 05 % ophthalmic emulsion    fluconazole (DIFLUCAN) 150 mg tablet    gabapentin (NEURONTIN) 100 mg capsule    hydrocortisone (ANUSOL-HC) 25 mg suppository    hyoscyamine (LEVSIN/SL) 0 125 mg SL tablet    ibuprofen (MOTRIN) 800 mg tablet    magnesium oxide (MAG-OX) 400 mg tablet    magnesium oxide (MAG-OX) 400 mg    pantoprazole (PROTONIX) 40 mg tablet    Riboflavin (VITAMIN B-2 PO)    valACYclovir (VALTREX) 500 mg tablet    zinc gluconate 50 mg tablet    fluticasone (FLONASE) 50 mcg/act nasal spray    Allergies   Allergen Reactions    Pollen Extract Itching     Sneezing, itching in the eyes           Objective     Blood pressure 102/80, height 5' 5" (1 651 m), weight 57 6 kg (127 lb)  Body mass index is 21 13 kg/m²  PHYSICAL EXAM:      General Appearance:   Alert, cooperative, no distress   HEENT:   Normocephalic, atraumatic, anicteric      Neck:  Supple, symmetrical, trachea midline   Lungs:   Clear to auscultation bilaterally; no rales, rhonchi or wheezing; respirations unlabored    Heart[de-identified]   Regular rate and rhythm; no murmur, rub, or gallop  Abdomen:   Soft, non-tender, non-distended; normal bowel sounds; no masses, no organomegaly    Genitalia:   Deferred    Rectal:   Deferred    Extremities:  No cyanosis, clubbing or edema    Pulses:  2+ and symmetric    Skin:  No jaundice, rashes, or lesions    Lymph nodes:  No palpable cervical lymphadenopathy        Lab Results:   No visits with results within 1 Day(s) from this visit     Latest known visit with results is:   Hospital Outpatient Visit on 05/04/2021   Component Date Value    EXT Preg Test, Ur 05/04/2021 Negative     Control 05/04/2021 Valid     Case Report 05/04/2021                      Value:Surgical Pathology Report                         Case: [id number]                                   Authorizing Provider:  Williams Griggs DO          Collected:           05/04/2021 1131              Ordering Location:      [facility]       Received:            05/04/2021 [address] [facility]                                                             Pathologist:           Lin Dickens MD                                                        Specimens:   A) - Duodenum                                                                                       B) - Stomach                                                                                        C) - Polyp, Colorectal, cecum                                                                       D) - Polyp, Colorectal, transverse                                                         Final Diagnosis 05/04/2021                      Value: This result contains rich text formatting which cannot be displayed here   Additional Information 05/04/2021                      Value: This result contains rich text formatting which cannot be displayed here   Synoptic Checklist 05/04/2021                      Value:  (COLON/RECTUM POLYP FORM - GI - All Specimens)                                                                                                                 : Adenoma(s)     Nicholas Pastel Description 05/04/2021                      Value: This result contains rich text formatting which cannot be displayed here   Clinical Information 05/04/2021                      Value:Cold bx r/o celiac         Radiology Results:   No results found    Answers for HPI/ROS submitted by the patient on 1/5/2022  Progression since onset: gradually improving  Pain - numeric: 0/10  anorexia: No  arthralgias: Yes  belching: Yes  constipation: Yes  dysuria: No  fever: No  flatus: Yes  frequency: Yes  hematochezia: No  hematuria: No  melena: No  myalgias: Yes  nausea:  No  weight loss: No  vomiting: No

## 2022-01-12 ENCOUNTER — TELEPHONE (OUTPATIENT)
Dept: GASTROENTEROLOGY | Facility: CLINIC | Age: 42
End: 2022-01-12

## 2022-01-12 NOTE — TELEPHONE ENCOUNTER
Received patients lab results  Scanned into patients chart and then put in [facility] s folder

## 2022-02-02 ENCOUNTER — HOSPITAL ENCOUNTER (OUTPATIENT)
Dept: MRI IMAGING | Facility: HOSPITAL | Age: 42
Discharge: HOME | End: 2022-02-02
Payer: COMMERCIAL

## 2022-02-02 DIAGNOSIS — Q34.9 THORACIC CYST: ICD-10-CM

## 2022-02-02 PROCEDURE — 72157 MRI CHEST SPINE W/O & W/DYE: CPT

## 2022-02-02 PROCEDURE — G1004 CDSM NDSC: HCPCS

## 2022-02-02 PROCEDURE — A9585 GADOBUTROL INJECTION: HCPCS | Performed by: RADIOLOGY

## 2022-02-02 RX ADMIN — GADOBUTROL 5 ML: 604.72 INJECTION INTRAVENOUS at 14:17

## 2022-02-16 ENCOUNTER — OFFICE VISIT (OUTPATIENT)
Dept: GASTROENTEROLOGY | Facility: CLINIC | Age: 42
End: 2022-02-16
Payer: COMMERCIAL

## 2022-02-16 VITALS
HEIGHT: 65 IN | DIASTOLIC BLOOD PRESSURE: 80 MMHG | BODY MASS INDEX: 21.52 KG/M2 | WEIGHT: 129.2 LBS | HEART RATE: 70 BPM | SYSTOLIC BLOOD PRESSURE: 90 MMHG

## 2022-02-16 DIAGNOSIS — K21.9 GASTROESOPHAGEAL REFLUX DISEASE, UNSPECIFIED WHETHER ESOPHAGITIS PRESENT: ICD-10-CM

## 2022-02-16 DIAGNOSIS — K58.1 IRRITABLE BOWEL SYNDROME WITH CONSTIPATION: Primary | ICD-10-CM

## 2022-02-16 PROCEDURE — 99213 OFFICE O/P EST LOW 20 MIN: CPT | Performed by: PHYSICIAN ASSISTANT

## 2022-02-16 NOTE — PROGRESS NOTES
[facility] - Outpatient Follow-up Note  Rita Graves 46 y o  female MRN: [id number]  Encounter: [id number]          ASSESSMENT AND PLAN:      1  Irritable bowel syndrome with constipation  2  Gastroesophageal reflux disease, unspecified whether esophagitis present  Again after her last appt she was unable to start any of the advised treatments as she suffered a COVID infection and had to post-pone her Lyme treatment    She has constipation with rare diarrhea and significant gas  Advised start Amitiza 24mcg - she will start once daily  Advised a probiotic     Her heartburn/globus sensation is controlled at present with Pantoprazole 40mg daily    She is going to the [place] in [place] - will f/u when she returns    ______________________________________________________________________    SUBJECTIVE:  53-year-old female with GERD and irritable bowel syndrome presents for routine follow-up  After her last appointment she did not start any of the advise treatments as she suffered a COVID infection  Because of the COVID infection her Lyme treatment got delayed and she just finished this the other day  He has Amitiza at home and is going to started ASAP  She is concerned about starting a twice a day as she is worried about the side effect of diarrhea and so she was started just once a  She remains on pantoprazole 40 mg once a day  She reports that this is actually controlling all of her symptoms present  She has no upper GI complaints today  She reports ongoing significant gaseousness and bloating  This is the most problematic symptom for her  REVIEW OF SYSTEMS IS OTHERWISE NEGATIVE        Historical Information   Past Medical History:   Diagnosis Date    Cholelithiasis     Colon polyp      Past Surgical History:   Procedure Laterality Date    COLONOSCOPY      less than 5 yrs ago    COMBINED ABDOMINOPLASTY AND LIPOSUCTION N/A      Social History   Social History     Substance and Sexual Activity   Alcohol Use Yes    Comment: occ     Social History     Substance and Sexual Activity   Drug Use No     Social History     Tobacco Use   Smoking Status Former Smoker    Packs/day: 0 50    Types: Cigarettes    Quit date: 2021    Years since quittin 1   Smokeless Tobacco Never Used   Tobacco Comment    vapes     Family History   Problem Relation Age of Onset    No Known Problems Mother     Cancer Father         large intestine       Meds/Allergies       Current Outpatient Medications:     Amitiza 24 MCG capsule    Ascorbic Acid 500 MG CHEW    cholecalciferol (VITAMIN D3) 1,000 units tablet    cycloSPORINE (RESTASIS) 0 05 % ophthalmic emulsion    hydrocortisone (ANUSOL-HC) 25 mg suppository    hyoscyamine (LEVSIN/SL) 0 125 mg SL tablet    ibuprofen (MOTRIN) 800 mg tablet    magnesium oxide (MAG-OX) 400 mg tablet    pantoprazole (PROTONIX) 40 mg tablet    Riboflavin (VITAMIN B-2 PO)    valACYclovir (VALTREX) 500 mg tablet    zinc gluconate 50 mg tablet    fluconazole (DIFLUCAN) 150 mg tablet    fluticasone (FLONASE) 50 mcg/act nasal spray    magnesium oxide (MAG-OX) 400 mg    Allergies   Allergen Reactions    Pollen Extract Itching     Sneezing, itching in the eyes           Objective     Blood pressure 90/80, pulse 70, height 5' 5" (1 651 m), weight 58 6 kg (129 lb 3 2 oz)  Body mass index is 21 5 kg/m²  PHYSICAL EXAM:      General Appearance:   Alert, cooperative, no distress   HEENT:   Normocephalic, atraumatic, anicteric      Neck:  Supple, symmetrical, trachea midline   Lungs:   Clear to auscultation bilaterally; no rales, rhonchi or wheezing; respirations unlabored    Heart[de-identified]   Regular rate and rhythm; no murmur, rub, or gallop     Abdomen:   Soft, non-tender, non-distended; normal bowel sounds; no masses, no organomegaly    Genitalia:   Deferred    Rectal:   Deferred    Extremities:  No cyanosis, clubbing or edema    Pulses:  2+ and symmetric    Skin:  No jaundice, rashes, or lesions    Lymph nodes:  No palpable cervical lymphadenopathy        Lab Results:   No visits with results within 1 Day(s) from this visit  Latest known visit with results is:   Hospital Outpatient Visit on 05/04/2021   Component Date Value    EXT Preg Test, Ur 05/04/2021 Negative     Control 05/04/2021 Valid     Case Report 05/04/2021                      Value:Surgical Pathology Report                         Case: [id number]                                   Authorizing Provider:  Mason Callow, DO          Collected:           05/04/2021 1131              Ordering Location:      [facility]       Received:            05/04/2021 [facility]                                                             Pathologist:           Eileen Michelle MD                                                        Specimens:   A) - Duodenum                                                                                       B) - Stomach                                                                                        C) - Polyp, Colorectal, cecum                                                                       D) - Polyp, Colorectal, transverse                                                         Final Diagnosis 05/04/2021                      Value: This result contains rich text formatting which cannot be displayed here   Additional Information 05/04/2021                      Value: This result contains rich text formatting which cannot be displayed here   Synoptic Checklist 05/04/2021                      Value:  (COLON/RECTUM POLYP FORM - GI - All Specimens)                                                                                                                 : Adenoma(s)     Sheryle Kil Description 05/04/2021                      Value: This result contains rich text formatting which cannot be displayed here      Clinical Information 05/04/2021                      Value:Cold bx r/o celiac         Radiology Results:   MRI thoracic spine with and without contrast    Result Date: 2/2/2022  Narrative: MRI THORACIC SPINE WITH AND WITHOUT CONTRAST 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, unspecified  COMPARISON:  Outside institution MRI 6/16/2021 TECHNIQUE:  Sagittal T1, sagittal T2, sagittal inversion recovery, axial T2,  axial 2D MERGE  Sagittal and axial T1 postcontrast  IV Contrast:  5 mL of Gadobutrol injection (SINGLE-DOSE) IMAGE QUALITY:  Diagnostic  FINDINGS: ALIGNMENT:  Alignment is unremarkable  No subluxation  MARROW SIGNAL:  Normal marrow signal is identified within the visualized bony structures  No discrete marrow lesion  THORACIC CORD:  Normal signal within the thoracic cord  Redemonstrated nonenhancing cystic lesion centered in the left canal at level of T4-5 (series 9 image 15) measuring 1 8 x 1 2 x 0 8 cm (craniocaudal by AP by transverse)  There is no extra foraminal extension  It exerts mass effect upon thecal sac without cord compression  PREVERTEBRAL AND PARASPINAL SOFT TISSUES:   Normal  THORACIC DEGENERATIVE CHANGE: No significant disc bulge or herniation  Canal and foramina are patent without stenosis  POSTCONTRAST:  No abnormal enhancement  Impression: Compared to outside institution MRI 6/16/2021, stable 1 8 cm nonenhancing extradural cystic lesion centered in the left canal at level T4-5 with mass effect upon thecal sac  No high-grade canal stenosis or cord compression    Differential considerations include type IA meningeal cyst (arachnoid cyst) versus epidermoid cyst  Workstation performed: [id number]

## 2022-02-17 NOTE — ASSESSMENT & PLAN NOTE
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  · Last seen in clinic for initial evaluation August 2021  · Continues to endorse occasional symptoms of numbness to arms and legs that resolve spontaneously  · Recently diagnosed with fibromyalgia and starting Cymbalta for this  · Neuro exam non-focal     Imaging:  · MRI thoracic spine w/wo, 2/2/2022: Compared to outside institution MRI 6/16/2021, stable 1 8 cm nonenhancing extradural cystic lesion centered in the left canal at level T4-5 with mass effect upon thecal sac  No high-grade canal stenosis or cord compression  Differential considerations include type IA meningeal cyst (arachnoid cyst) versus epidermoid cyst     Plan:  · Reviewed results of imaging with patient  · Discussed that since size of cyst is stable since 6 months ago and she is minimally symptomatic, it is reasonable to follow up with repeat MRI thoracic spine w/wo in 1 year for surveillance  · Patient verbalized understanding and is in agreement with plan  · Discussed that she should return sooner with any new symptoms

## 2022-02-18 ENCOUNTER — OFFICE VISIT (OUTPATIENT)
Dept: NEUROSURGERY | Facility: CLINIC | Age: 42
End: 2022-02-18
Payer: COMMERCIAL

## 2022-02-18 VITALS
WEIGHT: 129 LBS | HEART RATE: 64 BPM | DIASTOLIC BLOOD PRESSURE: 60 MMHG | HEIGHT: 65 IN | RESPIRATION RATE: 18 BRPM | BODY MASS INDEX: 21.49 KG/M2 | SYSTOLIC BLOOD PRESSURE: 99 MMHG | TEMPERATURE: 97.9 F

## 2022-02-18 DIAGNOSIS — Q34.9 THORACIC CYST: Primary | ICD-10-CM

## 2022-02-18 PROCEDURE — 99214 OFFICE O/P EST MOD 30 MIN: CPT | Performed by: NEUROLOGICAL SURGERY

## 2022-02-18 NOTE — PROGRESS NOTES
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  · Last seen in clinic for initial evaluation August 2021  · Continues to endorse occasional symptoms of numbness to arms and legs that resolve spontaneously  · Recently diagnosed with fibromyalgia and starting Cymbalta for this  · Neuro exam non-focal     Imaging:  · MRI thoracic spine w/wo, 2/2/2022: Compared to outside institution MRI 6/16/2021, stable 1 8 cm nonenhancing extradural cystic lesion centered in the left canal at level T4-5 with mass effect upon thecal sac  No high-grade canal stenosis or cord compression  Differential considerations include type IA meningeal cyst (arachnoid cyst) versus epidermoid cyst     Plan:  · Reviewed results of imaging with patient  · Discussed that since size of cyst is stable since 6 months ago and she is minimally symptomatic, it is reasonable to follow up with repeat MRI thoracic spine w/wo in 1 year for surveillance  · Patient verbalized understanding and is in agreement with plan  · Discussed that she should return sooner with any new symptoms  Diagnoses and all orders for this visit:    Thoracic cyst  -     MRI thoracic spine with and without contrast; Future            CHIEF COMPLAINT    Chief Complaint   Patient presents with    Follow-up       HISTORY    History of Present Illness     46y o  year old female with past medical history of IBS, GERD, fibromyalgia, Lyme disease, who presents for follow up of a thoracic extradural cyst  This was discovered on MRI completed in 2021 for workup of back pain  At the time, she was experiencing mid back pain with associated occasional numbness and tingling of her arms and legs  She continues to endorse occasional episodes of bilateral UE and LE numbness and tingling  There is no associated weakness   She was recently diagnosed with fibromyalgia and states that it is difficult to discern whether her pain is from that or her back  She was prescribed cymbalta but hasn't started it yet  She denies any difficulty walking  She denies any bowel or bladder incontinence or chest pain  She denies difficulty with fine motor tasks or clumsiness  She is not working  She lives at home with her  and 60-year-old daughter  HPI    See Discussion    REVIEW OF SYSTEMS    Review of Systems   Constitutional: Negative  HENT: Negative  Eyes: Negative  Respiratory: Negative  Cardiovascular: Negative  Gastrointestinal: Negative  Endocrine: Negative  Genitourinary: Negative for frequency  Musculoskeletal: Positive for back pain (mid and upper back pain)  Skin: Negative  Neurological: Positive for numbness (and tingling on b/l legs and hands at times)  Negative for dizziness and weakness (hands and leg )  Hematological: Negative  Psychiatric/Behavioral: Negative  ROS reviewed and edited as needed      Meds/Allergies     Current Outpatient Medications   Medication Sig Dispense Refill    Amitiza 24 MCG capsule TAKE 1 CAPSULE (24 MCG TOTAL) BY MOUTH 2 (TWO) TIMES A DAY WITH MEALS 180 capsule 1    Ascorbic Acid 500 MG CHEW 1 tab(s)      cholecalciferol (VITAMIN D3) 1,000 units tablet Take 1,000 Units by mouth daily      cycloSPORINE (RESTASIS) 0 05 % ophthalmic emulsion INSTILL 1 DROP INTO BOTH EYES EVERY TWELVE HOURS      fluticasone (FLONASE) 50 mcg/act nasal spray 1 spray into each nostril      hydrocortisone (ANUSOL-HC) 25 mg suppository Insert 1 suppository (25 mg total) into the rectum daily at bedtime 14 suppository 0    hyoscyamine (LEVSIN/SL) 0 125 mg SL tablet Take 1 tablet (0 125 mg total) by mouth every 6 (six) hours as needed for cramping 45 tablet 1    ibuprofen (MOTRIN) 800 mg tablet TAKE 1 TABLET BY MOUTH EVERY 8 HOURS AS NEEDED FOR MODERATE PAIN  0    magnesium oxide (MAG-OX) 400 mg tablet Take 1 tablet by mouth 2 (two) times a day      pantoprazole (PROTONIX) 40 mg tablet Take 1 tablet (40 mg total) by mouth 2 (two) times a day 60 tablet 3    Riboflavin (VITAMIN B-2 PO) TAKE 2 TABLETS (200 MG TOTAL) BY MOUTH 2 (TWO) TIMES A DAY      valACYclovir (VALTREX) 500 mg tablet 1 tab(s)prn      zinc gluconate 50 mg tablet Take 50 mg by mouth daily      fluconazole (DIFLUCAN) 150 mg tablet  (Patient not taking: Reported on 2022 )      magnesium oxide (MAG-OX) 400 mg Take 1 tablet (400 mg total) by mouth 2 (two) times a day (Patient not taking: Reported on 2022 ) 60 tablet 1     No current facility-administered medications for this visit  Allergies   Allergen Reactions    Pollen Extract Itching     Sneezing, itching in the eyes       PAST HISTORY    Past Medical History:   Diagnosis Date    Cholelithiasis     Colon polyp        Past Surgical History:   Procedure Laterality Date    COLONOSCOPY      less than 5 yrs ago    COMBINED ABDOMINOPLASTY AND LIPOSUCTION N/A        Social History     Tobacco Use    Smoking status: Former Smoker     Packs/day: 0 50     Types: Cigarettes     Quit date: 2021     Years since quittin 1    Smokeless tobacco: Never Used    Tobacco comment: vapes   Vaping Use    Vaping Use: Some days    Substances: Nicotine, Flavoring   Substance Use Topics    Alcohol use: Yes     Comment: occ    Drug use: No       Family History   Problem Relation Age of Onset    No Known Problems Mother     Cancer Father         large intestine         Above history personally reviewed  EXAM    Vitals:Blood pressure 99/60, pulse 64, temperature 97 9 °F (36 6 °C), temperature source Temporal, resp  rate 18, height 5' 5" (1 651 m), weight 58 5 kg (129 lb)  ,Body mass index is 21 47 kg/m²  Physical Exam  Constitutional:       Appearance: She is well-developed  HENT:      Head: Normocephalic and atraumatic     Eyes:      Extraocular Movements: EOM normal  Pupils: Pupils are equal, round, and reactive to light  Pulmonary:      Effort: Pulmonary effort is normal    Abdominal:      Palpations: Abdomen is soft  Musculoskeletal:         General: Normal range of motion  Cervical back: Normal range of motion and neck supple  Skin:     General: Skin is warm and dry  Neurological:      General: No focal deficit present  Mental Status: She is alert and oriented to person, place, and time  Mental status is at baseline  Cranial Nerves: No cranial nerve deficit  Sensory: No sensory deficit  Motor: No weakness  Coordination: Coordination normal  Finger-Nose-Finger Test normal       Gait: Gait normal       Deep Tendon Reflexes:      Reflex Scores:       Tricep reflexes are 2+ on the right side and 2+ on the left side  Bicep reflexes are 2+ on the right side and 2+ on the left side  Brachioradialis reflexes are 2+ on the right side and 2+ on the left side  Patellar reflexes are 2+ on the right side and 2+ on the left side  Achilles reflexes are 2+ on the right side and 2+ on the left side  Psychiatric:         Speech: Speech normal          Neurologic Exam     Mental Status   Oriented to person, place, and time  Oriented to person  Oriented to place  Oriented to time  Oriented to year, month and date  Registration: recalls 3 of 3 objects  Attention: normal  Concentration: normal    Speech: speech is normal   Level of consciousness: alert  Knowledge: good and consistent with education  Able to name object  Cranial Nerves     CN III, IV, VI   Pupils are equal, round, and reactive to light  Extraocular motions are normal    Right pupil: Size: 3 mm  Shape: regular  Reactivity: brisk  Consensual response: intact  Accommodation: intact  Left pupil: Size: 3 mm  Shape: regular  Reactivity: brisk  Consensual response: intact  Accommodation: intact     Nystagmus: none   Diplopia: none  Conjugate gaze: present    CN V   Right facial sensation deficit: none  Left facial sensation deficit: none    CN VII   Facial expression full, symmetric       CN VIII   Hearing: intact    CN IX, X   Palate: symmetric    CN XI   Right sternocleidomastoid strength: normal  Left sternocleidomastoid strength: normal  Right trapezius strength: normal  Left trapezius strength: normal    CN XII   Tongue: not atrophic  Fasciculations: absent  Tongue deviation: none    Motor Exam   Muscle bulk: normal  Overall muscle tone: normal  Right arm pronator drift: absent  Left arm pronator drift: absent    Strength   Right deltoid: 5/5  Left deltoid: 5/5  Right biceps: 5/5  Left biceps: 5/5  Right triceps: 5/5  Left triceps: 5/5  Right wrist flexion: 5/5  Left wrist flexion: 5/5  Right wrist extension: 5/5  Left wrist extension: 5/5  Right interossei: 5/5  Left interossei: 5/5  Right quadriceps: 5/5  Left quadriceps: 5/5  Right hamstrin/5  Left hamstrin/5  Right anterior tibial: 5/5  Left anterior tibial: 5/5  Right posterior tibial: 5/5  Left posterior tibial: 5/5  Right peroneal: 5/5  Left peroneal: 5/5  Right gastroc: 5/5  Left gastroc: 5/5    Sensory Exam   Light touch normal    Proprioception normal      Gait, Coordination, and Reflexes     Coordination   Finger to nose coordination: normal    Tremor   Resting tremor: absent  Intention tremor: absent  Action tremor: absent    Reflexes   Right brachioradialis: 2+  Left brachioradialis: 2+  Right biceps: 2+  Left biceps: 2+  Right triceps: 2+  Left triceps: 2+  Right patellar: 2+  Left patellar: 2+  Right achilles: 2+  Left achilles: 2+  Right : 2+  Left : 2+  Right Maher: absent  Left Maher: absent  Right ankle clonus: absent  Left ankle clonus: absent        MEDICAL DECISION MAKING    Imaging Studies:         MRI thoracic spine with and without contrast    Result Date: 2022  Narrative: MRI THORACIC SPINE WITH AND WITHOUT CONTRAST 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, unspecified  COMPARISON:  Outside institution MRI 6/16/2021 TECHNIQUE:  Sagittal T1, sagittal T2, sagittal inversion recovery, axial T2,  axial 2D MERGE  Sagittal and axial T1 postcontrast  IV Contrast:  5 mL of Gadobutrol injection (SINGLE-DOSE) IMAGE QUALITY:  Diagnostic  FINDINGS: ALIGNMENT:  Alignment is unremarkable  No subluxation  MARROW SIGNAL:  Normal marrow signal is identified within the visualized bony structures  No discrete marrow lesion  THORACIC CORD:  Normal signal within the thoracic cord  Redemonstrated nonenhancing cystic lesion centered in the left canal at level of T4-5 (series 9 image 15) measuring 1 8 x 1 2 x 0 8 cm (craniocaudal by AP by transverse)  There is no extra foraminal extension  It exerts mass effect upon thecal sac without cord compression  PREVERTEBRAL AND PARASPINAL SOFT TISSUES:   Normal  THORACIC DEGENERATIVE CHANGE: No significant disc bulge or herniation  Canal and foramina are patent without stenosis  POSTCONTRAST:  No abnormal enhancement  Impression: Compared to outside institution MRI 6/16/2021, stable 1 8 cm nonenhancing extradural cystic lesion centered in the left canal at level T4-5 with mass effect upon thecal sac  No high-grade canal stenosis or cord compression  Differential considerations include type IA meningeal cyst (arachnoid cyst) versus epidermoid cyst  Workstation performed: [id number]       I have personally reviewed pertinent reports     and I have personally reviewed pertinent films in PACS

## 2022-03-03 ENCOUNTER — TELEPHONE (OUTPATIENT)
Dept: GASTROENTEROLOGY | Facility: CLINIC | Age: 42
End: 2022-03-03

## 2022-03-03 NOTE — TELEPHONE ENCOUNTER
----- Message from Sis Gottlieb sent at 3/3/2022  7:30 AM EST -----  Regarding: Amitiza 24mg  Good morning Mrs John Mora do have a question   After your visit I referred to your advice and Deirdre Gonsales been taking amitiza for my chronic constipation once a day   I dont have a bowel movement like I wanted it doesnt seem like its helping much but instead It gives me gases and Im bloated I think its giving  me reverse action   I dont know what to do cause it is so uncomfortable to feel more bloated and gassy  Pls Ms  Joanna Ip I need your advice , thank you Joby Blanco

## 2022-03-10 ENCOUNTER — TELEPHONE (OUTPATIENT)
Dept: NEUROSURGERY | Facility: CLINIC | Age: 42
End: 2022-03-10

## 2022-03-10 ENCOUNTER — APPOINTMENT (EMERGENCY)
Dept: CT IMAGING | Facility: HOSPITAL | Age: 42
End: 2022-03-10
Payer: COMMERCIAL

## 2022-03-10 ENCOUNTER — HOSPITAL ENCOUNTER (EMERGENCY)
Facility: HOSPITAL | Age: 42
Discharge: HOME | End: 2022-03-10
Attending: EMERGENCY MEDICINE | Admitting: EMERGENCY MEDICINE
Payer: COMMERCIAL

## 2022-03-10 VITALS
RESPIRATION RATE: 18 BRPM | DIASTOLIC BLOOD PRESSURE: 77 MMHG | TEMPERATURE: 98 F | OXYGEN SATURATION: 99 % | HEART RATE: 77 BPM | SYSTOLIC BLOOD PRESSURE: 125 MMHG

## 2022-03-10 DIAGNOSIS — M54.2 NECK PAIN ON LEFT SIDE: ICD-10-CM

## 2022-03-10 DIAGNOSIS — M54.6 LEFT-SIDED THORACIC BACK PAIN, UNSPECIFIED CHRONICITY: Primary | ICD-10-CM

## 2022-03-10 LAB
EXT PREG TEST URINE: NEGATIVE
EXT. CONTROL ED NAV: NORMAL

## 2022-03-10 PROCEDURE — 99284 EMERGENCY DEPT VISIT MOD MDM: CPT

## 2022-03-10 PROCEDURE — 72128 CT CHEST SPINE W/O DYE: CPT

## 2022-03-10 PROCEDURE — 81025 URINE PREGNANCY TEST: CPT

## 2022-03-10 PROCEDURE — 96372 THER/PROPH/DIAG INJ SC/IM: CPT

## 2022-03-10 PROCEDURE — G1004 CDSM NDSC: HCPCS

## 2022-03-10 PROCEDURE — 72125 CT NECK SPINE W/O DYE: CPT

## 2022-03-10 RX ORDER — METHOCARBAMOL 500 MG/1
500 TABLET, FILM COATED ORAL 2 TIMES DAILY
Qty: 20 TABLET | Refills: 0 | Status: SHIPPED | OUTPATIENT
Start: 2022-03-10

## 2022-03-10 RX ORDER — KETOROLAC TROMETHAMINE 30 MG/ML
15 INJECTION, SOLUTION INTRAMUSCULAR; INTRAVENOUS ONCE
Status: COMPLETED | OUTPATIENT
Start: 2022-03-10 | End: 2022-03-10

## 2022-03-10 RX ADMIN — KETOROLAC TROMETHAMINE 15 MG: 30 INJECTION, SOLUTION INTRAMUSCULAR at 16:50

## 2022-03-10 NOTE — ED NOTES
Patient transported to [facility], [address] [place] [address]  03/10/22 [id number]

## 2022-03-10 NOTE — ED PROVIDER NOTES
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 with mass effect upon thecal sac  No high-grade canal stenosis or cord compression  Differential considerations include type IA meningeal cyst (arachnoid cyst) versus epidermoid cyst    She rates her pain an 8/10 and is requesting pain medication to relieve her symptoms  She complains of diffuse body aches and pain in the neck that radiates to the left shoulder down the left arm and up into the occipital region of her head  She denies any trauma to the head, neck, or back  This pain began when she was sitting on her couch  She also describes a tingling sensation in the back of her head  She denies fever, chest pain, shortness of breath, abdominal pain, exposure to sick persons, headache, vision changes  She has been experiencing bouts of nausea but denies vomiting         History provided by:  Patient   used: No    Neck Pain  Pain location:  L side  Quality:  Aching  Pain radiates to:  L shoulder and L arm  Pain severity:  Moderate  Pain is:  Same all the time  Onset quality:  Gradual  Duration:  4 days  Progression:  Worsening  Context: not fall and not recent injury    Relieved by:  Nothing  Worsened by:  Twisting  Associated symptoms: tingling    Associated symptoms: no chest pain, no fever, no headaches, no numbness and no weakness    Risk factors: hx of head and neck radiation    Back Pain  Location:  Thoracic spine  Quality:  Aching  Radiates to:  L shoulder  Pain severity:  Moderate  Pain is:  Same all the time  Duration:  4 days  Progression:  Worsening  Associated symptoms: tingling    Associated symptoms: no abdominal pain, no chest pain, no dysuria, no fever, no headaches, no numbness, no pelvic pain and no weakness        Prior to Admission Medications   Prescriptions Last Dose Informant Patient Reported? Taking?    Amitiza 24 MCG capsule  Self No No   Sig: TAKE 1 CAPSULE (24 MCG TOTAL) BY MOUTH 2 (TWO) TIMES A DAY WITH MEALS   Ascorbic Acid 500 MG CHEW  Self Yes No   Si tab(s)   Riboflavin (VITAMIN B-2 PO)  Self Yes No   Sig: TAKE 2 TABLETS (200 MG TOTAL) BY MOUTH 2 (TWO) TIMES A DAY   cholecalciferol (VITAMIN D3) 1,000 units tablet  Self Yes No   Sig: Take 1,000 Units by mouth daily   cycloSPORINE (RESTASIS) 0 05 % ophthalmic emulsion  Self Yes No   Sig: INSTILL 1 DROP INTO BOTH EYES EVERY TWELVE HOURS   fluconazole (DIFLUCAN) 150 mg tablet  Self Yes No   Patient not taking: Reported on 2022    fluticasone (FLONASE) 50 mcg/act nasal spray  Self Yes No   Si spray into each nostril   hydrocortisone (ANUSOL-HC) 25 mg suppository  Self No No   Sig: Insert 1 suppository (25 mg total) into the rectum daily at bedtime   hyoscyamine (LEVSIN/SL) 0 125 mg SL tablet  Self No No   Sig: Take 1 tablet (0 125 mg total) by mouth every 6 (six) hours as needed for cramping   ibuprofen (MOTRIN) 800 mg tablet  Self Yes No   Sig: TAKE 1 TABLET BY MOUTH EVERY 8 HOURS AS NEEDED FOR MODERATE PAIN   magnesium oxide (MAG-OX) 400 mg  Self No No   Sig: Take 1 tablet (400 mg total) by mouth 2 (two) times a day   Patient not taking: Reported on 2022    magnesium oxide (MAG-OX) 400 mg tablet  Self Yes No   Sig: Take 1 tablet by mouth 2 (two) times a day   pantoprazole (PROTONIX) 40 mg tablet  Self No No   Sig: Take 1 tablet (40 mg total) by mouth 2 (two) times a day   valACYclovir (VALTREX) 500 mg tablet  Self Yes No   Si tab(s)prn   zinc gluconate 50 mg tablet  Self Yes No   Sig: Take 50 mg by mouth daily      Facility-Administered Medications: None       Past Medical History:   Diagnosis Date    Cholelithiasis     Colon polyp        Past Surgical History:   Procedure Laterality Date    COLONOSCOPY      less than 5 yrs ago    COMBINED ABDOMINOPLASTY AND LIPOSUCTION N/A        Family History   Problem Relation Age of Onset    No Known Problems Mother     Cancer Father         large intestine     I have reviewed and agree with the history as documented  E-Cigarette/Vaping    E-Cigarette Use Current Some Day User      E-Cigarette/Vaping Substances    Nicotine Yes     THC No     CBD No     Flavoring Yes     Other No     Unknown No      Social History     Tobacco Use    Smoking status: Former Smoker     Packs/day: 0 50     Types: Cigarettes     Quit date: 2021     Years since quittin 1    Smokeless tobacco: Never Used    Tobacco comment: vapes   Vaping Use    Vaping Use: Some days    Substances: Nicotine, Flavoring   Substance Use Topics    Alcohol use: Yes     Comment: occ    Drug use: No       Review of Systems   Constitutional: Negative for chills, diaphoresis, fatigue and fever  HENT: Negative for drooling, ear pain and sore throat  Eyes: Negative for pain and visual disturbance  Respiratory: Negative for cough, chest tightness and shortness of breath  Cardiovascular: Negative for chest pain and palpitations  Gastrointestinal: Positive for nausea  Negative for abdominal pain and vomiting  Genitourinary: Negative for dysuria and pelvic pain  Musculoskeletal: Positive for back pain, myalgias and neck pain  Negative for joint swelling and neck stiffness  Skin: Negative for color change, rash and wound  Neurological: Positive for tingling  Negative for dizziness, weakness, numbness and headaches         Physical Exam  Physical Exam  Constitutional:       General: She is not in acute distress  Appearance: Normal appearance  HENT:      Head: Normocephalic and atraumatic  Right Ear: External ear normal       Left Ear: External ear normal       Nose: Nose normal    Eyes:      General:         Right eye: No discharge  Left eye: No discharge  Conjunctiva/sclera: Conjunctivae normal    Neck:      Meningeal: Brudzinski's sign absent  Cardiovascular:      Rate and Rhythm: Normal rate  Pulses: Normal pulses  Heart sounds: Normal heart sounds  Pulmonary:      Effort: Pulmonary effort is normal  No respiratory distress  Breath sounds: Normal breath sounds  Abdominal:      Palpations: Abdomen is soft  Tenderness: There is no abdominal tenderness  Musculoskeletal:         General: No tenderness  Normal range of motion  Right shoulder: No swelling or tenderness  Normal range of motion  Normal strength  Left shoulder: No swelling or tenderness  Normal range of motion  Normal strength  Cervical back: Normal range of motion and neck supple  No signs of trauma, rigidity, tenderness or bony tenderness  Pain with movement present  Thoracic back: No deformity, tenderness or bony tenderness  Lumbar back: No deformity, tenderness or bony tenderness  Skin:     General: Skin is warm and dry  Capillary Refill: Capillary refill takes less than 2 seconds  Neurological:      Mental Status: She is alert and oriented to person, place, and time  Mental status is at baseline  Motor: No weakness        Gait: Gait normal    Psychiatric:         Mood and Affect: Mood normal          Behavior: Behavior normal          Vital Signs  ED Triage Vitals [03/10/22 1549]   Temperature Pulse Respirations Blood Pressure SpO2   98 °F (36 7 °C) 77 18 125/77 99 %      Temp Source Heart Rate Source Patient Position - Orthostatic VS BP Location FiO2 (%)   Oral Monitor Sitting Right arm --      Pain Score       7           Vitals:    03/10/22 1549   BP: 125/77   Pulse: 77   Patient Position - Orthostatic VS: Sitting         Visual Acuity  Visual Acuity      Most Recent Value   L Pupil Size (mm) 3   R Pupil Size (mm) 3          ED Medications  Medications   ketorolac (TORADOL) injection 15 mg (15 mg Intramuscular Given 3/10/22 1650)       Diagnostic Studies  Results Reviewed     Procedure Component Value Units Date/Time    POCT pregnancy, urine [[id number]]  (Normal) Resulted: 03/10/22 1648    Lab Status: Final result Updated: 03/10/22 1648     EXT PREG TEST UR (Ref: Negative) negative     Control valid                 CT cervical spine without contrast   Final Result by Eleanor Miller MD (03/10 1736)      No acute fracture or dislocation  Workstation performed: [id number]         CT thoracic spine without contrast   Final Result by Eleanor Miller MD (03/10 1740)      No acute fracture or dislocation  Workstation performed: [id number]                    Procedures  Procedures         ED Course  ED Course as of 03/10/22 1804   Thu Mar 10, 2022   1738 Cervical CT impression: No acute fracture or dislocation  200 Thoracic Spine CT impression: No acute fracture or dislocation  MDM  Number of Diagnoses or Management Options  Left-sided thoracic back pain, unspecified chronicity: new and requires workup  Neck pain on left side: new and requires workup  Diagnosis management comments: Ms Rod Quan is a 46year old female who presents with acute neck and thoracic back pain that began 4 days ago and has worsened since with no suspected injury or trauma  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 and was recently diagnosed with fibromyalgia  She took her first dose of Cymbalta this afternoon at 1245   Based on her history and the pattern of her pain and her review of systems, I ordered a CT of the neck and thoracic spine to adequately visualize the cyst and bony and soft-tissue structures of the neck and back  Included in my differential is herniated disc, mass effect of T4-5 cyst on spinal cord, compression fracture, muscle strain, and fibromyalgia flare-up  CT neck and thoracic spine resulted in no acute fracture or dislocation  I suspect this pain is due to a muscle strain or her fibromyalgia  In which case, I discharged her with a script for Robaxin and instructed her to call her PCP, who manages her fibromyalgia, to further evaluate her symptoms  Also instructed her to return to the ED if symptoms worsens  Amount and/or Complexity of Data Reviewed  Clinical lab tests: ordered and reviewed  Tests in the radiology section of CPT®: ordered and reviewed  Discussion of test results with the performing providers: yes  Discuss the patient with other providers: yes  Independent visualization of images, tracings, or specimens: yes    Patient Progress  Patient progress: stable      Disposition  Final diagnoses:   Left-sided thoracic back pain, unspecified chronicity   Neck pain on left side     Time reflects when diagnosis was documented in both MDM as applicable and the Disposition within this note     Time User Action Codes Description Comment    3/10/2022  5:51 PM Magdalena Mcrae Add [M54 6] Left-sided thoracic back pain, unspecified chronicity     3/10/2022  5:51 PM Magdalena Mcrae Add [M54 2] Neck pain on left side       ED Disposition     ED Disposition Condition Date/Time Comment    Discharge Stable u Mar 10, 2022  5:39 PM Hany Roberson discharge to home/self care              Follow-up Information     Follow up With Specialties Details Why Contact Info Additional Information    Aurora Grande MD  Call in 3 days follow up for further evaluation of symptoms [address] [phone]       [address] [facility] Emergency Department Emergency Medicine Go to  If symptoms worsen [address]  [facility] [address] [facility] [id number] [facility] Emergency Department, [address] [facility], [place], [place], [address]          Discharge Medication List as of 3/10/2022  5:57 PM      START taking these medications    Details   methocarbamol (ROBAXIN) 500 mg tablet Take 1 tablet (500 mg total) by mouth 2 (two) times a day As needed for pain, Starting u 3/10/2022, Normal         CONTINUE these medications which have NOT CHANGED    Details   Amitiza 24 MCG capsule TAKE 1 CAPSULE (24 MCG TOTAL) BY MOUTH 2 (TWO) TIMES A DAY WITH MEALS, Normal      Ascorbic Acid 500 MG CHEW 1 tab(s), Historical Med      cholecalciferol (VITAMIN D3) 1,000 units tablet Take 1,000 Units by mouth daily, Historical Med      cycloSPORINE (RESTASIS) 0 05 % ophthalmic emulsion INSTILL 1 DROP INTO BOTH EYES EVERY TWELVE HOURS, Historical Med      fluconazole (DIFLUCAN) 150 mg tablet Starting Tue 9/28/2021, Historical Med      fluticasone (FLONASE) 50 mcg/act nasal spray 1 spray into each nostril, Starting Tue 3/5/2019, Until Fri 2/18/2022 at 2359, Historical Med      hydrocortisone (ANUSOL-HC) 25 mg suppository Insert 1 suppository (25 mg total) into the rectum daily at bedtime, Starting Wed 1/27/2021, Normal      hyoscyamine (LEVSIN/SL) 0 125 mg SL tablet Take 1 tablet (0 125 mg total) by mouth every 6 (six) hours as needed for cramping, Starting Fri 3/26/2021, Normal      ibuprofen (MOTRIN) 800 mg tablet TAKE 1 TABLET BY MOUTH EVERY 8 HOURS AS NEEDED FOR MODERATE PAIN, Historical Med      magnesium oxide (MAG-OX) 400 mg tablet Take 1 tablet by mouth 2 (two) times a day, Starting Tue 9/1/2020, Historical Med      magnesium oxide (MAG-OX) 400 mg Take 1 tablet (400 mg total) by mouth 2 (two) times a day, Starting Tue 9/1/2020, Normal      pantoprazole (PROTONIX) 40 mg tablet Take 1 tablet (40 mg total) by mouth 2 (two) times a day, Starting Wed 1/5/2022, Normal      Riboflavin (VITAMIN B-2 PO) TAKE 2 TABLETS (200 MG TOTAL) BY MOUTH 2 (TWO) TIMES A DAY, Historical Med      valACYclovir (VALTREX) 500 mg tablet 1 tab(s)prn, Historical Med      zinc gluconate 50 mg tablet Take 50 mg by mouth daily, Historical Med             No discharge procedures on file      PDMP Review     None          ED Provider  Electronically Signed by           Glorious Carrel, PA-C  03/10/22 9615

## 2022-03-10 NOTE — TELEPHONE ENCOUNTER
Received a call from Sandra Nagy requesting call regarding her pain  Noted that she is presently in the ED  Will monitor chart for outcome

## 2022-03-30 ENCOUNTER — OFFICE VISIT (OUTPATIENT)
Dept: GASTROENTEROLOGY | Facility: CLINIC | Age: 42
End: 2022-03-30
Payer: COMMERCIAL

## 2022-03-30 VITALS
DIASTOLIC BLOOD PRESSURE: 76 MMHG | HEIGHT: 65 IN | HEART RATE: 105 BPM | WEIGHT: 128 LBS | SYSTOLIC BLOOD PRESSURE: 122 MMHG | BODY MASS INDEX: 21.33 KG/M2

## 2022-03-30 DIAGNOSIS — K21.9 GASTROESOPHAGEAL REFLUX DISEASE, UNSPECIFIED WHETHER ESOPHAGITIS PRESENT: ICD-10-CM

## 2022-03-30 DIAGNOSIS — K58.1 IRRITABLE BOWEL SYNDROME WITH CONSTIPATION: Primary | ICD-10-CM

## 2022-03-30 PROCEDURE — 99213 OFFICE O/P EST LOW 20 MIN: CPT | Performed by: PHYSICIAN ASSISTANT

## 2022-03-30 NOTE — PROGRESS NOTES
[facility] - Outpatient Follow-up Note  Mary Cueva 46 y o  female MRN: [id number]  Encounter: [id number]          ASSESSMENT AND PLAN:      1  Irritable bowel syndrome with constipation  2  Gastroesophageal reflux disease, unspecified whether esophagitis present  She is feeling well and only taking Amitiza and Pantoprazole PRN with good relief! Will f/u prn    ______________________________________________________________________    SUBJECTIVE:  37-year-old female with constipation predominant irritable bowel syndrome and GERD presents for routine follow-up  After her last appointment she started the Amitiza once daily and continue the pantoprazole daily  She recently went on a trip to the [facility] where she was eating a large amount of fresh fruit  She reports that since that time she actually has not had any problems with her bowel movements  She is now only using pantoprazole and amitiza as needed with good relief  She is very happy with her symptom control at present  REVIEW OF SYSTEMS IS OTHERWISE NEGATIVE        Historical Information   Past Medical History:   Diagnosis Date    Cholelithiasis     Colon polyp      Past Surgical History:   Procedure Laterality Date    COLONOSCOPY      less than 5 yrs ago    COMBINED ABDOMINOPLASTY AND LIPOSUCTION N/A      Social History   Social History     Substance and Sexual Activity   Alcohol Use Yes    Comment: occ     Social History     Substance and Sexual Activity   Drug Use No     Social History     Tobacco Use   Smoking Status Former Smoker    Packs/day: 0 50    Types: Cigarettes    Quit date: 2021    Years since quittin 2   Smokeless Tobacco Never Used   Tobacco Comment    vapes     Family History   Problem Relation Age of Onset    No Known Problems Mother     Cancer Father         large intestine       Meds/Allergies       Current Outpatient Medications:     Amitiza 24 MCG capsule    Ascorbic Acid 500 MG CHEW    cholecalciferol (VITAMIN D3) 1,000 units tablet    cycloSPORINE (RESTASIS) 0 05 % ophthalmic emulsion    fluconazole (DIFLUCAN) 150 mg tablet    hydrocortisone (ANUSOL-HC) 25 mg suppository    hyoscyamine (LEVSIN/SL) 0 125 mg SL tablet    ibuprofen (MOTRIN) 800 mg tablet    magnesium oxide (MAG-OX) 400 mg tablet    magnesium oxide (MAG-OX) 400 mg    methocarbamol (ROBAXIN) 500 mg tablet    pantoprazole (PROTONIX) 40 mg tablet    Riboflavin (VITAMIN B-2 PO)    valACYclovir (VALTREX) 500 mg tablet    zinc gluconate 50 mg tablet    fluticasone (FLONASE) 50 mcg/act nasal spray    Allergies   Allergen Reactions    Pollen Extract Itching     Sneezing, itching in the eyes           Objective     Blood pressure 122/76, pulse 105, height 5' 5" (1 651 m), weight 58 1 kg (128 lb)  Body mass index is 21 3 kg/m²  PHYSICAL EXAM:      General Appearance:   Alert, cooperative, no distress   HEENT:   Normocephalic, atraumatic, anicteric      Neck:  Supple, symmetrical, trachea midline   Lungs:   Clear to auscultation bilaterally; no rales, rhonchi or wheezing; respirations unlabored    Heart[de-identified]   Regular rate and rhythm; no murmur, rub, or gallop  Abdomen:   Soft, non-tender, non-distended; normal bowel sounds; no masses, no organomegaly    Genitalia:   Deferred    Rectal:   Deferred    Extremities:  No cyanosis, clubbing or edema    Pulses:  2+ and symmetric    Skin:  No jaundice, rashes, or lesions    Lymph nodes:  No palpable cervical lymphadenopathy        Lab Results:   No visits with results within 1 Day(s) from this visit     Latest known visit with results is:   Admission on 03/10/2022, Discharged on 03/10/2022   Component Date Value    EXT PREG TEST UR (Ref: N* 03/10/2022 negative     Control 03/10/2022 valid          Radiology Results:   CT cervical spine without contrast    Result Date: 3/10/2022  Narrative: CT CERVICAL SPINE - WITHOUT CONTRAST INDICATION:   Neck pain, acute, no red flags neck and thoracic pain with known thoracic cyst  COMPARISON:  None  TECHNIQUE:  CT examination of the cervical spine was performed without intravenous contrast   Contiguous axial images were obtained  Sagittal and coronal reconstructions were performed  Radiation dose length product (DLP) for this visit:  289 mGy-cm   This examination, like all CT scans performed in the [facility], was performed utilizing techniques to minimize radiation dose exposure, including the use of iterative reconstruction and automated exposure control  IMAGE QUALITY:  Diagnostic  FINDINGS: ALIGNMENT:  There is reversal of normal cervical lordosis  There is no significant subluxation  No compression deformity  VERTEBRAL BODIES:  No fracture  DEGENERATIVE CHANGES:  No significant cervical degenerative changes are noted  PREVERTEBRAL AND PARASPINAL SOFT TISSUES:  Unremarkable  THORACIC INLET:  Normal      Impression: No acute fracture or dislocation  Workstation performed: [id number]     CT thoracic spine without contrast    Result Date: 3/10/2022  Narrative: CT THORACIC SPINE INDICATION:   Mid-back pain Bone lesion, thoracic spine, incidental neck and thoracic spine pain with known thoracic cyst  COMPARISON:  None  TECHNIQUE:  Contiguous axial images were obtained  Sagittal and coronal reconstructions were performed  Radiation dose length product (DLP) for this visit:  519 mGy-cm   This examination, like all CT scans performed in the [facility], was performed utilizing techniques to minimize radiation dose exposure, including the use of iterative reconstruction and automated exposure control  IMAGE QUALITY:  Diagnostic  FINDINGS: ALIGNMENT:  Normal alignment of the thoracic spine  No subluxation  VERTEBRAL BODIES: No fracture  DEGENERATIVE CHANGES:  No degenerative changes  No canal stenosis or foraminal narrowing   PARASPINAL SOFT TISSUES: Normal      Impression: No acute fracture or dislocation   Workstation performed: [id number]

## 2022-09-21 RX ORDER — TRIAMCINOLONE ACETONIDE 5 MG/G
1 CREAM TOPICAL 3 TIMES DAILY
COMMUNITY
Start: 2022-06-10

## 2022-09-21 RX ORDER — TRAZODONE HYDROCHLORIDE 50 MG/1
50 TABLET ORAL
COMMUNITY
Start: 2022-09-07 | End: 2022-10-07

## 2022-09-21 RX ORDER — DICLOFENAC SODIUM 75 MG/1
75 TABLET, DELAYED RELEASE ORAL
COMMUNITY
Start: 2022-05-24 | End: 2023-05-24

## 2022-09-22 ENCOUNTER — OFFICE VISIT (OUTPATIENT)
Dept: GASTROENTEROLOGY | Facility: CLINIC | Age: 42
End: 2022-09-22
Payer: COMMERCIAL

## 2022-09-22 VITALS
WEIGHT: 130 LBS | HEART RATE: 81 BPM | BODY MASS INDEX: 21.66 KG/M2 | DIASTOLIC BLOOD PRESSURE: 78 MMHG | OXYGEN SATURATION: 100 % | SYSTOLIC BLOOD PRESSURE: 110 MMHG | HEIGHT: 65 IN

## 2022-09-22 DIAGNOSIS — R10.9 ABDOMINAL PAIN, UNSPECIFIED ABDOMINAL LOCATION: Primary | ICD-10-CM

## 2022-09-22 PROCEDURE — 99213 OFFICE O/P EST LOW 20 MIN: CPT | Performed by: PHYSICIAN ASSISTANT

## 2022-09-22 RX ORDER — LORATADINE 10 MG/1
10 TABLET ORAL DAILY
COMMUNITY
Start: 2022-08-17

## 2022-09-22 NOTE — PROGRESS NOTES
[facility] - Outpatient Follow-up Note  Estella Craig 38 y o  female MRN: [id number]  Encounter: [id number]          ASSESSMENT AND PLAN:      1  Abdominal pain, unspecified abdominal location  She woke up Saturday morning with severe right sided abdominal pain  The pain has slowly improved but is still present  She got her period today and is unsure if it is related    She has known gb polyps and sludge - will check US Abdomen  She has known fibroids and irregular menses - will check US pelvis    She is having regular bowel movements at present    ______________________________________________________________________    SUBJECTIVE:  53-year-old female with a history of irritable bowel syndrome and GERD who presents for evaluation of abdominal pain  She reports that this past Saturday morning she woke up with intense right-sided abdominal pain  She reports that it was associated with nausea but no other symptoms  She reports that the pain slowly improved over the course of the day and has improved over the past several days but is still present  She denies any vomiting with the nausea  She previously had major constipation issues but reports she is now having regular bowel movements  She attributes this to drinking aloe vera juice every day  Her acid reflux has been under mostly good control  She uses pantoprazole as needed  She reports the last time she took pantoprazole was several weeks ago  She has a known history of gallbladder polyps and sludge  She had her last ultrasound was in December of 2021  She also has a known history of uterine fibroids  She also notes an issue with irregular menses  Interestingly, she reports that she got her menstrual period yesterday  She does typically have painful menstrual periods although she notes that this pain is different from her typical pains  REVIEW OF SYSTEMS IS OTHERWISE NEGATIVE        Historical Information   Past Medical History:   Diagnosis Date    Cholelithiasis     Colon polyp      Past Surgical History:   Procedure Laterality Date    COLONOSCOPY      less than 5 yrs ago    COMBINED ABDOMINOPLASTY AND LIPOSUCTION N/A      Social History   Social History     Substance and Sexual Activity   Alcohol Use Yes    Comment: occ     Social History     Substance and Sexual Activity   Drug Use No     Social History     Tobacco Use   Smoking Status Former Smoker    Packs/day: 0 50    Types: Cigarettes    Quit date: 2021    Years since quittin 7   Smokeless Tobacco Never Used   Tobacco Comment    vapes     Family History   Problem Relation Age of Onset    No Known Problems Mother     Cancer Father         large intestine       Meds/Allergies       Current Outpatient Medications:     Amitiza 24 MCG capsule    Ascorbic Acid 500 MG CHEW    cholecalciferol (VITAMIN D3) 1,000 units tablet    cycloSPORINE (RESTASIS) 0 05 % ophthalmic emulsion    diclofenac (VOLTAREN) 75 mg EC tablet    fluconazole (DIFLUCAN) 150 mg tablet    hydrocortisone (ANUSOL-HC) 25 mg suppository    hyoscyamine (LEVSIN/SL) 0 125 mg SL tablet    ibuprofen (MOTRIN) 800 mg tablet    loratadine (CLARITIN) 10 mg tablet    magnesium oxide (MAG-OX) 400 mg tablet    magnesium oxide (MAG-OX) 400 mg    methocarbamol (ROBAXIN) 500 mg tablet    pantoprazole (PROTONIX) 40 mg tablet    Riboflavin (VITAMIN B-2 PO)    traZODone (DESYREL) 50 mg tablet    triamcinolone (KENALOG) 0 5 % cream    valACYclovir (VALTREX) 500 mg tablet    zinc gluconate 50 mg tablet    fluticasone (FLONASE) 50 mcg/act nasal spray    Allergies   Allergen Reactions    Pollen Extract Itching     Sneezing, itching in the eyes           Objective     Blood pressure 110/78, pulse 81, height 5' 5" (1 651 m), weight 59 kg (130 lb), SpO2 100 %  Body mass index is 21 63 kg/m²        PHYSICAL EXAM:      General Appearance:   Alert, cooperative, no distress   HEENT:   Normocephalic, atraumatic, anicteric      Neck:  Supple, symmetrical, trachea midline   Lungs:   Clear to auscultation bilaterally; no rales, rhonchi or wheezing; respirations unlabored    Heart[de-identified]   Regular rate and rhythm; no murmur, rub, or gallop  Abdomen:   Soft, non-tender, non-distended; normal bowel sounds; no masses, no organomegaly    Genitalia:   Deferred    Rectal:   Deferred    Extremities:  No cyanosis, clubbing or edema    Pulses:  2+ and symmetric    Skin:  No jaundice, rashes, or lesions    Lymph nodes:  No palpable cervical lymphadenopathy        Lab Results:   No visits with results within 1 Day(s) from this visit  Latest known visit with results is:   Admission on 03/10/2022, Discharged on 03/10/2022   Component Date Value    EXT PREG TEST UR (Ref: N* 03/10/2022 negative     Control 03/10/2022 valid          Radiology Results:   No results found

## 2022-10-07 ENCOUNTER — APPOINTMENT (OUTPATIENT)
Dept: RADIOLOGY | Facility: HOSPITAL | Age: 42
End: 2022-10-07
Payer: COMMERCIAL

## 2022-10-07 ENCOUNTER — APPOINTMENT (EMERGENCY)
Dept: CT IMAGING | Facility: HOSPITAL | Age: 42
End: 2022-10-07
Payer: COMMERCIAL

## 2022-10-07 ENCOUNTER — HOSPITAL ENCOUNTER (EMERGENCY)
Facility: HOSPITAL | Age: 42
Discharge: HOME | End: 2022-10-07
Attending: EMERGENCY MEDICINE
Payer: COMMERCIAL

## 2022-10-07 VITALS
TEMPERATURE: 97.9 F | SYSTOLIC BLOOD PRESSURE: 102 MMHG | RESPIRATION RATE: 16 BRPM | HEART RATE: 65 BPM | OXYGEN SATURATION: 100 % | DIASTOLIC BLOOD PRESSURE: 66 MMHG

## 2022-10-07 DIAGNOSIS — R07.89 ATYPICAL CHEST PAIN: Primary | ICD-10-CM

## 2022-10-07 LAB
ALBUMIN SERPL BCP-MCNC: 3.7 G/DL (ref 3.5–5)
ALP SERPL-CCNC: 73 U/L (ref 46–116)
ALT SERPL W P-5'-P-CCNC: 18 U/L (ref 12–78)
ANION GAP SERPL CALCULATED.3IONS-SCNC: 7 MMOL/L (ref 4–13)
AST SERPL W P-5'-P-CCNC: 13 U/L (ref 5–45)
BASOPHILS # BLD AUTO: 0.05 THOUSANDS/ΜL (ref 0–0.1)
BASOPHILS NFR BLD AUTO: 1 % (ref 0–1)
BILIRUB SERPL-MCNC: 0.41 MG/DL (ref 0.2–1)
BUN SERPL-MCNC: 17 MG/DL (ref 5–25)
CALCIUM SERPL-MCNC: 8.7 MG/DL (ref 8.3–10.1)
CARDIAC TROPONIN I PNL SERPL HS: <2 NG/L
CARDIAC TROPONIN I PNL SERPL HS: <2 NG/L
CHLORIDE SERPL-SCNC: 107 MMOL/L (ref 96–108)
CO2 SERPL-SCNC: 26 MMOL/L (ref 21–32)
CREAT SERPL-MCNC: 1.04 MG/DL (ref 0.6–1.3)
EOSINOPHIL # BLD AUTO: 0.12 THOUSAND/ΜL (ref 0–0.61)
EOSINOPHIL NFR BLD AUTO: 1 % (ref 0–6)
ERYTHROCYTE [DISTWIDTH] IN BLOOD BY AUTOMATED COUNT: 12.8 % (ref 11.6–15.1)
GFR SERPL CREATININE-BSD FRML MDRD: 67 ML/MIN/1.73SQ M
GLUCOSE SERPL-MCNC: 103 MG/DL (ref 65–140)
HCG SERPL QL: NEGATIVE
HCT VFR BLD AUTO: 40.1 % (ref 34.8–46.1)
HGB BLD-MCNC: 13.2 G/DL (ref 11.5–15.4)
IMM GRANULOCYTES # BLD AUTO: 0.02 THOUSAND/UL (ref 0–0.2)
IMM GRANULOCYTES NFR BLD AUTO: 0 % (ref 0–2)
LYMPHOCYTES # BLD AUTO: 1.87 THOUSANDS/ΜL (ref 0.6–4.47)
LYMPHOCYTES NFR BLD AUTO: 20 % (ref 14–44)
MCH RBC QN AUTO: 30.3 PG (ref 26.8–34.3)
MCHC RBC AUTO-ENTMCNC: 32.9 G/DL (ref 31.4–37.4)
MCV RBC AUTO: 92 FL (ref 82–98)
MONOCYTES # BLD AUTO: 0.55 THOUSAND/ΜL (ref 0.17–1.22)
MONOCYTES NFR BLD AUTO: 6 % (ref 4–12)
NEUTROPHILS # BLD AUTO: 6.56 THOUSANDS/ΜL (ref 1.85–7.62)
NEUTS SEG NFR BLD AUTO: 72 % (ref 43–75)
NRBC BLD AUTO-RTO: 0 /100 WBCS
PLATELET # BLD AUTO: 211 THOUSANDS/UL (ref 149–390)
PMV BLD AUTO: 11 FL (ref 8.9–12.7)
POTASSIUM SERPL-SCNC: 4 MMOL/L (ref 3.5–5.3)
PROT SERPL-MCNC: 7.6 G/DL (ref 6.4–8.4)
RBC # BLD AUTO: 4.36 MILLION/UL (ref 3.81–5.12)
SODIUM SERPL-SCNC: 140 MMOL/L (ref 135–147)
WBC # BLD AUTO: 9.17 THOUSAND/UL (ref 4.31–10.16)

## 2022-10-07 PROCEDURE — 71275 CT ANGIOGRAPHY CHEST: CPT

## 2022-10-07 PROCEDURE — 36415 COLL VENOUS BLD VENIPUNCTURE: CPT

## 2022-10-07 PROCEDURE — 71046 X-RAY EXAM CHEST 2 VIEWS: CPT

## 2022-10-07 PROCEDURE — 93005 ELECTROCARDIOGRAM TRACING: CPT

## 2022-10-07 PROCEDURE — 84484 ASSAY OF TROPONIN QUANT: CPT

## 2022-10-07 PROCEDURE — 99285 EMERGENCY DEPT VISIT HI MDM: CPT

## 2022-10-07 PROCEDURE — 96374 THER/PROPH/DIAG INJ IV PUSH: CPT

## 2022-10-07 PROCEDURE — 85025 COMPLETE CBC W/AUTO DIFF WBC: CPT

## 2022-10-07 PROCEDURE — 84703 CHORIONIC GONADOTROPIN ASSAY: CPT

## 2022-10-07 PROCEDURE — 74174 CTA ABD&PLVS W/CONTRAST: CPT

## 2022-10-07 PROCEDURE — 80053 COMPREHEN METABOLIC PANEL: CPT

## 2022-10-07 RX ORDER — KETOROLAC TROMETHAMINE 30 MG/ML
15 INJECTION, SOLUTION INTRAMUSCULAR; INTRAVENOUS ONCE
Status: COMPLETED | OUTPATIENT
Start: 2022-10-07 | End: 2022-10-07

## 2022-10-07 RX ORDER — NAPROXEN 500 MG/1
500 TABLET ORAL 2 TIMES DAILY WITH MEALS
Qty: 14 TABLET | Refills: 0 | Status: SHIPPED | OUTPATIENT
Start: 2022-10-07

## 2022-10-07 RX ADMIN — KETOROLAC TROMETHAMINE 15 MG: 30 INJECTION, SOLUTION INTRAMUSCULAR at 20:27

## 2022-10-07 RX ADMIN — IOHEXOL 100 ML: 350 INJECTION, SOLUTION INTRAVENOUS at 19:09

## 2022-10-07 NOTE — ED PROVIDER NOTES
History  Chief Complaint   Patient presents with    Chest Pain     Pain in right side of chest for a few days  Pain worse with deep breath and certain movements  Patient is a 46-year-old female with no past medical history presenting to the emergency department for evaluation of chest pain and shortness of breath  Reports she has been having chest pain shortness of breath for the past 4 days  Reports that has been worsening  Reports she is having pain with deep inspiration on the right side of her chest   Reports pain substernally radiating to her mid upper back  Patient denies taking any recent long trips, no exogenous/vision, he no leg pain  Reports she does vape which contains nicotine  Denies fevers, chills, rash, headache, weakness, dizziness, visual changes, abdominal pain, nausea, vomiting, diarrhea, constipation,  difficulty breathing  Does not offer any other concerns or complaints  History provided by:  Patient   used: No    Chest Pain  Pain location:  Substernal area and R chest  Pain quality: aching and sharp    Pain radiates to:  Upper back and mid back  Pain radiates to the back: yes    Duration:  4 days  Timing:  Constant  Progression:  Worsening  Chronicity:  New  Context: breathing and at rest    Relieved by:  None tried  Worsened by:  Deep breathing  Ineffective treatments:  None tried  Associated symptoms: back pain and shortness of breath    Associated symptoms: no abdominal pain, no altered mental status, no anorexia, no anxiety, no claudication, no cough, no diaphoresis, no dizziness, no dysphagia, no fatigue, no fever, no headache, no heartburn, no lower extremity edema, no nausea, no near-syncope, no numbness, no orthopnea, no palpitations, no syncope, not vomiting and no weakness    Risk factors: no birth control and no hypertension        Prior to Admission Medications   Prescriptions Last Dose Informant Patient Reported? Taking?    Amitiza 24 MCG capsule Self No No   Sig: TAKE 1 CAPSULE (24 MCG TOTAL) BY MOUTH 2 (TWO) TIMES A DAY WITH MEALS   Ascorbic Acid 500 MG CHEW  Self Yes No   Si tab(s)   Riboflavin (VITAMIN B-2 PO)  Self Yes No   Sig: TAKE 2 TABLETS (200 MG TOTAL) BY MOUTH 2 (TWO) TIMES A DAY   cholecalciferol (VITAMIN D3) 1,000 units tablet  Self Yes No   Sig: Take 1,000 Units by mouth daily   cycloSPORINE (RESTASIS) 0 05 % ophthalmic emulsion  Self Yes No   Sig: INSTILL 1 DROP INTO BOTH EYES EVERY TWELVE HOURS   diclofenac (VOLTAREN) 75 mg EC tablet  Self Yes No   Sig: Take 75 mg by mouth   fluconazole (DIFLUCAN) 150 mg tablet  Self Yes No   Sig:     fluticasone (FLONASE) 50 mcg/act nasal spray  Self Yes No   Si spray into each nostril   hydrocortisone (ANUSOL-HC) 25 mg suppository  Self No No   Sig: Insert 1 suppository (25 mg total) into the rectum daily at bedtime   hyoscyamine (LEVSIN/SL) 0 125 mg SL tablet  Self No No   Sig: Take 1 tablet (0 125 mg total) by mouth every 6 (six) hours as needed for cramping   ibuprofen (MOTRIN) 800 mg tablet  Self Yes No   Sig: TAKE 1 TABLET BY MOUTH EVERY 8 HOURS AS NEEDED FOR MODERATE PAIN   loratadine (CLARITIN) 10 mg tablet  Self Yes No   Sig: Take 10 mg by mouth daily   magnesium oxide (MAG-OX) 400 mg  Self No No   Sig: Take 1 tablet (400 mg total) by mouth 2 (two) times a day   magnesium oxide (MAG-OX) 400 mg tablet  Self Yes No   Sig: Take 1 tablet by mouth 2 (two) times a day   methocarbamol (ROBAXIN) 500 mg tablet  Self No No   Sig: Take 1 tablet (500 mg total) by mouth 2 (two) times a day As needed for pain   pantoprazole (PROTONIX) 40 mg tablet  Self No No   Sig: Take 1 tablet (40 mg total) by mouth 2 (two) times a day   traZODone (DESYREL) 50 mg tablet  Self Yes No   Sig: Take 50 mg by mouth   triamcinolone (KENALOG) 0 5 % cream  Self Yes No   Sig: Apply 1 application topically 3 (three) times a day   valACYclovir (VALTREX) 500 mg tablet  Self Yes No   Si tab(s)prn   zinc gluconate 50 mg tablet Self Yes No   Sig: Take 50 mg by mouth daily      Facility-Administered Medications: None       Past Medical History:   Diagnosis Date    Cholelithiasis     Colon polyp        Past Surgical History:   Procedure Laterality Date    COLONOSCOPY      less than 5 yrs ago    COMBINED ABDOMINOPLASTY AND LIPOSUCTION N/A        Family History   Problem Relation Age of Onset    No Known Problems Mother     Cancer Father         large intestine     I have reviewed and agree with the history as documented  E-Cigarette/Vaping    E-Cigarette Use Current Some Day User      E-Cigarette/Vaping Substances    Nicotine Yes     THC No     CBD No     Flavoring Yes     Other No     Unknown No      Social History     Tobacco Use    Smoking status: Former Smoker     Packs/day: 0 50     Types: Cigarettes     Quit date: 2021     Years since quittin 7    Smokeless tobacco: Never Used    Tobacco comment: vapes   Vaping Use    Vaping Use: Some days    Substances: Nicotine, Flavoring   Substance Use Topics    Alcohol use: Yes     Comment: occ    Drug use: No       Review of Systems   Constitutional: Negative for chills, diaphoresis, fatigue and fever  HENT: Negative for ear pain, sore throat and trouble swallowing  Eyes: Negative for pain and visual disturbance  Respiratory: Positive for shortness of breath  Negative for cough, chest tightness and wheezing  Cardiovascular: Positive for chest pain  Negative for palpitations, orthopnea, claudication, leg swelling, syncope and near-syncope  Gastrointestinal: Negative for abdominal pain, anorexia, constipation, diarrhea, heartburn, nausea and vomiting  Genitourinary: Negative for dysuria, flank pain, hematuria and urgency  Musculoskeletal: Positive for back pain  Negative for arthralgias, myalgias, neck pain and neck stiffness  Skin: Negative for color change and rash     Neurological: Negative for dizziness, seizures, syncope, weakness, numbness and headaches  All other systems reviewed and are negative  Physical Exam  Physical Exam  Vitals and nursing note reviewed  Constitutional:       General: She is not in acute distress  Appearance: Normal appearance  She is well-developed  She is not ill-appearing, toxic-appearing or diaphoretic  HENT:      Head: Normocephalic and atraumatic  Right Ear: External ear normal       Left Ear: External ear normal       Nose: Nose normal       Mouth/Throat:      Mouth: Mucous membranes are moist    Eyes:      General: No scleral icterus  Right eye: No discharge  Left eye: No discharge  Conjunctiva/sclera: Conjunctivae normal    Cardiovascular:      Rate and Rhythm: Normal rate and regular rhythm  Pulses:           Dorsalis pedis pulses are 2+ on the right side and 2+ on the left side  Posterior tibial pulses are 2+ on the right side and 2+ on the left side  Heart sounds: No murmur heard  Pulmonary:      Effort: Pulmonary effort is normal  No respiratory distress  Breath sounds: Normal breath sounds  No decreased breath sounds, wheezing, rhonchi or rales  Chest:      Chest wall: No tenderness  Abdominal:      Palpations: Abdomen is soft  Tenderness: There is no abdominal tenderness  Musculoskeletal:         General: No swelling, deformity or signs of injury  Normal range of motion  Right wrist: Normal pulse  Left wrist: Normal pulse  Cervical back: Normal range of motion and neck supple  No rigidity  Right lower leg: No tenderness  No edema  Left lower leg: No tenderness  No edema  Skin:     General: Skin is warm and dry  Capillary Refill: Capillary refill takes less than 2 seconds  Coloration: Skin is not jaundiced  Findings: No erythema or rash  Neurological:      General: No focal deficit present  Mental Status: She is alert and oriented to person, place, and time  Mental status is at baseline  Cranial Nerves: No cranial nerve deficit  Gait: Gait normal    Psychiatric:         Mood and Affect: Mood normal          Behavior: Behavior normal          Thought Content:  Thought content normal          Judgment: Judgment normal          Vital Signs  ED Triage Vitals   Temperature Pulse Respirations Blood Pressure SpO2   10/07/22 1726 10/07/22 1726 10/07/22 1726 10/07/22 1726 10/07/22 1726   97 9 °F (36 6 °C) 80 16 119/66 100 %      Temp Source Heart Rate Source Patient Position - Orthostatic VS BP Location FiO2 (%)   10/07/22 1726 10/07/22 1726 10/07/22 1815 10/07/22 1726 --   Oral Monitor Sitting Right arm       Pain Score       10/07/22 1726       5           Vitals:    10/07/22 1726 10/07/22 1815 10/07/22 1930   BP: 119/66 100/63 102/66   Pulse: 80 71 65   Patient Position - Orthostatic VS:  Sitting Lying         Visual Acuity      ED Medications  Medications   iohexol (OMNIPAQUE) 350 MG/ML injection (SINGLE-DOSE) 100 mL (100 mL Intravenous Given 10/7/22 1909)   ketorolac (TORADOL) injection 15 mg (15 mg Intravenous Given 10/7/22 2027)       Diagnostic Studies  Results Reviewed     Procedure Component Value Units Date/Time    HS Troponin I 4hr [[id number]] Collected: 10/07/22 2024    Lab Status: Final result Specimen: Blood Updated: 10/07/22 2110     hs TnI 4hr <2 ng/L      Delta 4hr hsTnI --    hCG, qualitative pregnancy [[id number]]  (Normal) Collected: 10/07/22 1751    Lab Status: Final result Specimen: Blood from Arm, Left Updated: 10/07/22 1849     Preg, Serum Negative    HS Troponin 0hr (reflex protocol) [[id number]]  (Normal) Collected: 10/07/22 1751    Lab Status: Final result Specimen: Blood from Arm, Left Updated: 10/07/22 1819     hs TnI 0hr <2 ng/L     Comprehensive metabolic panel [[id number]] Collected: 10/07/22 1751    Lab Status: Final result Specimen: Blood from Arm, Left Updated: 10/07/22 1811     Sodium 140 mmol/L      Potassium 4 0 mmol/L      Chloride 107 mmol/L      CO2 26 mmol/L ANION GAP 7 mmol/L      BUN 17 mg/dL      Creatinine 1 04 mg/dL      Glucose 103 mg/dL      Calcium 8 7 mg/dL      AST 13 U/L      ALT 18 U/L      Alkaline Phosphatase 73 U/L      Total Protein 7 6 g/dL      Albumin 3 7 g/dL      Total Bilirubin 0 41 mg/dL      eGFR 67 ml/min/1 73sq m     Narrative:      [place] guidelines for Chronic Kidney Disease (CKD):     Stage 1 with normal or high GFR (GFR > 90 mL/min/1 73 square meters)    Stage 2 Mild CKD (GFR = 60-89 mL/min/1 73 square meters)    Stage 3A Moderate CKD (GFR = 45-59 mL/min/1 73 square meters)    Stage 3B Moderate CKD (GFR = 30-44 mL/min/1 73 square meters)    Stage 4 Severe CKD (GFR = 15-29 mL/min/1 73 square meters)    Stage 5 End Stage CKD (GFR <15 mL/min/1 73 square meters)  Note: GFR calculation is accurate only with a steady state creatinine    CBC and differential [[id number]] Collected: 10/07/22 1751    Lab Status: Final result Specimen: Blood from Arm, Left Updated: 10/07/22 1755     WBC 9 17 Thousand/uL      RBC 4 36 Million/uL      Hemoglobin 13 2 g/dL      Hematocrit 40 1 %      MCV 92 fL      MCH 30 3 pg      MCHC 32 9 g/dL      RDW 12 8 %      MPV 11 0 fL      Platelets 756 Thousands/uL      nRBC 0 /100 WBCs      Neutrophils Relative 72 %      Immat GRANS % 0 %      Lymphocytes Relative 20 %      Monocytes Relative 6 %      Eosinophils Relative 1 %      Basophils Relative 1 %      Neutrophils Absolute 6 56 Thousands/µL      Immature Grans Absolute 0 02 Thousand/uL      Lymphocytes Absolute 1 87 Thousands/µL      Monocytes Absolute 0 55 Thousand/µL      Eosinophils Absolute 0 12 Thousand/µL      Basophils Absolute 0 05 Thousands/µL                  CTA dissection protocol chest/abdomen/pelvis   Final Result by Rand Calderon MD (10/07 2006)      No evidence of intramural hematoma, aneurysm or dissection  No evidence of pulmonary embolism         Incidental thyroid nodule(s) for which nonemergent thyroid ultrasound is recommended  Workstation performed: [id number]         XR chest 2 views    (Results Pending)              Procedures  ECG 12 Lead Documentation Only    Date/Time: 10/7/2022 5:28 PM  Performed by: Derek Lovelace PA-C  Authorized by: Derek Lovelace PA-C     Indications / Diagnosis:  Chest Pain  ECG reviewed by me, the ED Provider: yes    Patient location:  ED  Previous ECG:     Previous ECG:  Unavailable  Interpretation:     Interpretation: normal    Rate:     ECG rate:  77    ECG rate assessment: normal    Rhythm:     Rhythm: sinus rhythm    Ectopy:     Ectopy: none    QRS:     QRS axis:  Normal    QRS intervals:  Normal  Conduction:     Conduction: normal    ST segments:     ST segments:  Normal  T waves:     T waves: normal               ED Course  ED Course as of 10/08/22 0039   Fri Oct 07, 2022   2019 Continued chest pain, toradol and 2 hour trop    2135 Reports having alleviation of pain with Toradol             HEART Risk Score    Flowsheet Row Most Recent Value   Heart Score Risk Calculator    History 1 Filed at: 10/07/2022 1727   ECG 0 Filed at: 10/07/2022 1727   Age 0 Filed at: 10/07/2022 1727   Risk Factors 0 Filed at: 10/07/2022 1727   Troponin 0 Filed at: 10/07/2022 1727   HEART Score 1 Filed at: 10/07/2022 1727                        SBIRT 20yo+    Flowsheet Row Most Recent Value   SBIRT (23 yo +)    In order to provide better care to our patients, we are screening all of our patients for alcohol and drug use  Would it be okay to ask you these screening questions? Yes Filed at: 10/07/2022 1811   Initial Alcohol Screen: US AUDIT-C     1  How often do you have a drink containing alcohol? 0 Filed at: 10/07/2022 1811   2  How many drinks containing alcohol do you have on a typical day you are drinking? 0 Filed at: 10/07/2022 1811   3b  FEMALE Any Age, or MALE 65+: How often do you have 4 or more drinks on one occassion?  0 Filed at: 10/07/2022 1811   Audit-C Score 0 Filed at: 10/07/2022 1811   GRANT: How many times in the past year have you    Used an illegal drug or used a prescription medication for non-medical reasons? Never Filed at: 10/07/2022 1811          Wells' Criteria for PE    Flowsheet Row Most Recent Value   Wells' Criteria for PE    Clinical signs and symptoms of DVT 0 Filed at: 10/07/2022 1727   PE is primary diagnosis or equally likely 3 Filed at: 10/07/2022 1727   HR >100 0 Filed at: 10/07/2022 1727   Immobilization at least 3 days or Surgery in the previous 4 weeks 0 Filed at: 10/07/2022 1727   Previous, objectively diagnosed PE or DVT 0 Filed at: 10/07/2022 1727   Hemoptysis 0 Filed at: 10/07/2022 1727   Malignancy with treatment within 6 months or palliative 0 Filed at: 10/07/2022 1727   Ed' Criteria Total 3 Filed at: 10/07/2022 1727                MDM  Number of Diagnoses or Management Options  Atypical chest pain: new and requires workup  Diagnosis management comments: This is a 51-year-old female presented to the emergency department for evaluation chest pain shortness of breath  Reports the symptoms have been ongoing for the past 4 days but worsening  Reports the chest pain is located on the right side of her chest radiating substernally and going into her mid upper back  Reports pain is worse with taking a deep breath  Denies any leg pain or swelling  Denies any recent travel, long trips or exogenous estrogen  Reports she does vape  Differential diagnosis to include but is not limited to:  ACS, STEMI, pneumonia, PE, aortic dissection, musculoskeletal strain    Initial ED Plan:  CBC, CMP, troponin, EKG, chest x-ray, CT dissection study    ED results:  No evidence of intramural hematoma, aneurysm or dissection  0 hour troponin: < 2  Delta: <2  Heart score: 1  Wells criteria: 3    Final ED assessment: Patient is stable and well appearing  Discussed radiologic studies and laboratory results    Discussed follow-up with family doctor for incidental thyroid nodule noted on CT scan  Discussed naproxen as needed for pain  Strict return precautions were discussed including but not limited to chest pain, shortness of breath, difficulty breathing, fevers, weakness, dizziness  Patient verbalized understanding and is agreeable with the plan for discharge  Amount and/or Complexity of Data Reviewed  Clinical lab tests: ordered and reviewed  Tests in the radiology section of CPT®: ordered and reviewed  Independent visualization of images, tracings, or specimens: yes        Disposition  Final diagnoses:   Atypical chest pain     Time reflects when diagnosis was documented in both MDM as applicable and the Disposition within this note     Time User Action Codes Description Comment    10/7/2022  9:43 PM Blenda Rolling Add [R07 89] Atypical chest pain       ED Disposition     ED Disposition   Discharge    Condition   Stable    Date/Time   Fri Oct 7, 2022  9:43 PM    Comment   Ranjit Gregory discharge to home/self care                 Follow-up Information     Follow up With Specialties Details Why Contact Info Additional Information    Juventino Ruiz MD  Call in 3 days For follow up [address]  [phone]       [facility] Emergency Department Emergency Medicine Go to  If symptoms worsen [address] Emergency Department, [address] [place] [address], [facility]          Discharge Medication List as of 10/7/2022  9:44 PM      START taking these medications    Details   naproxen (Naprosyn) 500 mg tablet Take 1 tablet (500 mg total) by mouth 2 (two) times a day with meals, Starting Fri 10/7/2022, Normal         CONTINUE these medications which have NOT CHANGED    Details   Amitiza 24 MCG capsule TAKE 1 CAPSULE (24 MCG TOTAL) BY MOUTH 2 (TWO) TIMES A DAY WITH MEALS, Normal      Ascorbic Acid 500 MG CHEW 1 tab(s), Historical Med      cholecalciferol (VITAMIN D3) 1,000 units tablet Take 1,000 Units by mouth daily, Historical Med      cycloSPORINE (RESTASIS) 0 05 % ophthalmic emulsion INSTILL 1 DROP INTO BOTH EYES EVERY TWELVE HOURS, Historical Med      diclofenac (VOLTAREN) 75 mg EC tablet Take 75 mg by mouth, Starting Tue 5/24/2022, Until Wed 5/24/2023 at 2359, Historical Med      fluconazole (DIFLUCAN) 150 mg tablet  , Starting Tue 9/28/2021, Historical Med      fluticasone (FLONASE) 50 mcg/act nasal spray 1 spray into each nostril, Starting Tue 3/5/2019, Until Fri 2/18/2022 at 2359, Historical Med      hydrocortisone (ANUSOL-HC) 25 mg suppository Insert 1 suppository (25 mg total) into the rectum daily at bedtime, Starting Wed 1/27/2021, Normal      hyoscyamine (LEVSIN/SL) 0 125 mg SL tablet Take 1 tablet (0 125 mg total) by mouth every 6 (six) hours as needed for cramping, Starting Fri 3/26/2021, Normal      ibuprofen (MOTRIN) 800 mg tablet TAKE 1 TABLET BY MOUTH EVERY 8 HOURS AS NEEDED FOR MODERATE PAIN, Historical Med      loratadine (CLARITIN) 10 mg tablet Take 10 mg by mouth daily, Starting Wed 8/17/2022, Historical Med      magnesium oxide (MAG-OX) 400 mg tablet Take 1 tablet by mouth 2 (two) times a day, Starting Tue 9/1/2020, Historical Med      magnesium oxide (MAG-OX) 400 mg Take 1 tablet (400 mg total) by mouth 2 (two) times a day, Starting Tue 9/1/2020, Normal      methocarbamol (ROBAXIN) 500 mg tablet Take 1 tablet (500 mg total) by mouth 2 (two) times a day As needed for pain, Starting Thu 3/10/2022, Normal      pantoprazole (PROTONIX) 40 mg tablet Take 1 tablet (40 mg total) by mouth 2 (two) times a day, Starting Wed 1/5/2022, Normal      Riboflavin (VITAMIN B-2 PO) TAKE 2 TABLETS (200 MG TOTAL) BY MOUTH 2 (TWO) TIMES A DAY, Historical Med      traZODone (DESYREL) 50 mg tablet Take 50 mg by mouth, Starting Wed 9/7/2022, Until Fri 10/7/2022 at 2359, Historical Med      triamcinolone (KENALOG) 0 5 % cream Apply 1 application topically 3 (three) times a day, Starting Fri 6/10/2022, Historical Med      valACYclovir (VALTREX) 500 mg tablet 1 tab(s)prn, Historical Med      zinc gluconate 50 mg tablet Take 50 mg by mouth daily, Historical Med             No discharge procedures on file      PDMP Review     None          ED Provider  Electronically Signed by           Jeff Carballo PA-C  10/08/22 5702

## 2022-10-08 LAB
ATRIAL RATE: 77 BPM
P AXIS: 74 DEGREES
PR INTERVAL: 146 MS
QRS AXIS: 76 DEGREES
QRSD INTERVAL: 82 MS
QT INTERVAL: 370 MS
QTC INTERVAL: 418 MS
T WAVE AXIS: 66 DEGREES
VENTRICULAR RATE: 77 BPM

## 2022-10-08 PROCEDURE — 93010 ELECTROCARDIOGRAM REPORT: CPT | Performed by: INTERNAL MEDICINE

## 2022-10-08 NOTE — DISCHARGE INSTRUCTIONS
Follow up with PCP  Follow up with cardiology  Naproxen as needed for pain  Return to the ED with new or worsening symptoms including but not limited to worsening pain, shortness of breath, difficulty breathing

## 2022-10-13 ENCOUNTER — TELEPHONE (OUTPATIENT)
Dept: GASTROENTEROLOGY | Facility: CLINIC | Age: 42
End: 2022-10-13

## 2022-10-13 NOTE — TELEPHONE ENCOUNTER
Called patient and gave her test results  She has some concerns and would like a return phone call from you  Please advise  Thank you!

## 2022-10-13 NOTE — TELEPHONE ENCOUNTER
----- Message from Silver Jeronimo PA-C sent at 10/13/2022 12:34 PM EDT -----  Pls advise patient this doesn't show anything acutely concerning and she can f/u with gyn for her irregular periods

## 2023-01-23 ENCOUNTER — OFFICE VISIT (OUTPATIENT)
Dept: GASTROENTEROLOGY | Facility: CLINIC | Age: 43
End: 2023-01-23

## 2023-01-23 VITALS
HEIGHT: 65 IN | SYSTOLIC BLOOD PRESSURE: 90 MMHG | HEART RATE: 83 BPM | BODY MASS INDEX: 22.33 KG/M2 | DIASTOLIC BLOOD PRESSURE: 80 MMHG | WEIGHT: 134 LBS | OXYGEN SATURATION: 97 %

## 2023-01-23 DIAGNOSIS — K21.9 GASTROESOPHAGEAL REFLUX DISEASE, UNSPECIFIED WHETHER ESOPHAGITIS PRESENT: ICD-10-CM

## 2023-01-23 DIAGNOSIS — K58.1 IRRITABLE BOWEL SYNDROME WITH CONSTIPATION: Primary | ICD-10-CM

## 2023-01-23 RX ORDER — GABAPENTIN 300 MG/1
CAPSULE ORAL
COMMUNITY
Start: 2022-11-15

## 2023-01-23 RX ORDER — DICYCLOMINE HCL 20 MG
20 TABLET ORAL EVERY 6 HOURS PRN
Qty: 60 TABLET | Refills: 3 | Status: SHIPPED | OUTPATIENT
Start: 2023-01-23 | End: 2023-01-31

## 2023-01-23 RX ORDER — LINACLOTIDE 145 UG/1
145 CAPSULE, GELATIN COATED ORAL DAILY
Qty: 30 CAPSULE | Refills: 3 | Status: SHIPPED | OUTPATIENT
Start: 2023-01-23

## 2023-01-23 RX ORDER — TRIAMCINOLONE ACETONIDE 1 MG/G
1 CREAM TOPICAL 2 TIMES DAILY
COMMUNITY
Start: 2022-11-15

## 2023-01-23 RX ORDER — IBUPROFEN 600 MG/1
TABLET ORAL
COMMUNITY
Start: 2022-11-04

## 2023-01-23 RX ORDER — ESTRADIOL 1 MG/1
1 TABLET ORAL DAILY
COMMUNITY
Start: 2023-01-17

## 2023-01-23 RX ORDER — FAMOTIDINE 20 MG/1
20 TABLET, FILM COATED ORAL 2 TIMES DAILY PRN
Qty: 30 TABLET | Refills: 3 | Status: SHIPPED | OUTPATIENT
Start: 2023-01-23 | End: 2023-01-31

## 2023-01-23 NOTE — PROGRESS NOTES
[facility] - Outpatient Follow-up Note  Debra Ghosh 38 y o  female MRN: [id number]  Encounter: [id number]          ASSESSMENT AND PLAN:      1  Irritable bowel syndrome with constipation  Since her hysterectomy this has worsened  Amitiza caused nausea  Will trial Linzess 145mcg daily  Continue high fiber diet  Continue at least 64 ounces water per day    2  Gastroesophageal reflux disease, unspecified whether esophagitis present  Worsening as well  Pantoprazole caused headaches  Will trial Pepcid PRN    ______________________________________________________________________    SUBJECTIVE: 59-year-old female with a long history of constipation and acid reflux who presents for follow-up  After her last appointment she underwent a hysterectomy  She was left with her right ovary  Since that time she notes significant worsening of her constipation  She also reports a recurrence of her acid reflux  She tried taking Amitiza however this caused severe nausea  She also tried taking pantoprazole as needed but notes that it caused severe headaches  She has significantly increased her water and fiber intake  She notes that she is having a bowel movement about once a week  She is having excessive cramping and gaseousness and bloating  REVIEW OF SYSTEMS IS OTHERWISE NEGATIVE        Historical Information   Past Medical History:   Diagnosis Date   • Cholelithiasis    • Colon polyp      Past Surgical History:   Procedure Laterality Date   • COLONOSCOPY      less than 5 yrs ago   • COMBINED ABDOMINOPLASTY AND LIPOSUCTION N/A      Social History   Social History     Substance and Sexual Activity   Alcohol Use Yes    Comment: occ     Social History     Substance and Sexual Activity   Drug Use No     Social History     Tobacco Use   Smoking Status Former   • Packs/day: 0 50   • Types: Cigarettes   • Quit date: 2021   • Years since quittin 0   Smokeless Tobacco Never   Tobacco Comments albert     Family History   Problem Relation Age of Onset   • No Known Problems Mother    • Cancer Father         large intestine       Meds/Allergies       Current Outpatient Medications:   •  Ascorbic Acid 500 MG CHEW  •  cholecalciferol (VITAMIN D3) 1,000 units tablet  •  cycloSPORINE (RESTASIS) 0 05 % ophthalmic emulsion  •  diclofenac (VOLTAREN) 75 mg EC tablet  •  dicyclomine (BENTYL) 20 mg tablet  •  estradiol (ESTRACE) 1 mg tablet  •  famotidine (PEPCID) 20 mg tablet  •  gabapentin (NEURONTIN) 300 mg capsule  •  hydrocortisone (ANUSOL-HC) 25 mg suppository  •  ibuprofen (MOTRIN) 600 mg tablet  •  ibuprofen (MOTRIN) 800 mg tablet  •  linaCLOtide (Linzess) 145 MCG CAPS  •  loratadine (CLARITIN) 10 mg tablet  •  magnesium oxide (MAG-OX) 400 mg tablet  •  magnesium oxide (MAG-OX) 400 mg  •  methocarbamol (ROBAXIN) 500 mg tablet  •  naproxen (Naprosyn) 500 mg tablet  •  pantoprazole (PROTONIX) 40 mg tablet  •  Riboflavin (VITAMIN B-2 PO)  •  triamcinolone (KENALOG) 0 1 % cream  •  triamcinolone (KENALOG) 0 5 % cream  •  valACYclovir (VALTREX) 500 mg tablet  •  zinc gluconate 50 mg tablet  •  fluticasone (FLONASE) 50 mcg/act nasal spray  •  traZODone (DESYREL) 50 mg tablet    Allergies   Allergen Reactions   • Pollen Extract Itching     Sneezing, itching in the eyes           Objective     Blood pressure 90/80, pulse 83, height 5' 5" (1 651 m), weight 60 8 kg (134 lb), SpO2 97 %  Body mass index is 22 3 kg/m²  PHYSICAL EXAM:      General Appearance:   Alert, cooperative, no distress   HEENT:   Normocephalic, atraumatic, anicteric      Neck:  Supple, symmetrical, trachea midline   Lungs:   Clear to auscultation bilaterally; no rales, rhonchi or wheezing; respirations unlabored    Heart[de-identified]   Regular rate and rhythm; no murmur, rub, or gallop     Abdomen:   Soft, non-tender, non-distended; normal bowel sounds; no masses, no organomegaly    Genitalia:   Deferred    Rectal:   Deferred    Extremities:  No cyanosis, clubbing or edema    Pulses:  2+ and symmetric    Skin:  No jaundice, rashes, or lesions    Lymph nodes:  No palpable cervical lymphadenopathy        Lab Results:   No visits with results within 1 Day(s) from this visit     Latest known visit with results is:   Admission on 10/07/2022, Discharged on 10/07/2022   Component Date Value   • Ventricular Rate 10/07/2022 77    • Atrial Rate 10/07/2022 77    • KS Interval 10/07/2022 146    • QRSD Interval 10/07/2022 82    • QT Interval 10/07/2022 370    • QTC Interval 10/07/2022 418    • P Axis 10/07/2022 74    • QRS Axis 10/07/2022 76    • T Wave Axis 10/07/2022 66    • WBC 10/07/2022 9 17    • RBC 10/07/2022 4 36    • Hemoglobin 10/07/2022 13 2    • Hematocrit 10/07/2022 40 1    • MCV 10/07/2022 92    • MCH 10/07/2022 30 3    • MCHC 10/07/2022 32 9    • RDW 10/07/2022 12 8    • MPV 10/07/2022 11 0    • Platelets 56/56/8511 211    • nRBC 10/07/2022 0    • Neutrophils Relative 10/07/2022 72    • Immat GRANS % 10/07/2022 0    • Lymphocytes Relative 10/07/2022 20    • Monocytes Relative 10/07/2022 6    • Eosinophils Relative 10/07/2022 1    • Basophils Relative 10/07/2022 1    • Neutrophils Absolute 10/07/2022 6 56    • Immature Grans Absolute 10/07/2022 0 02    • Lymphocytes Absolute 10/07/2022 1 87    • Monocytes Absolute 10/07/2022 0 55    • Eosinophils Absolute 10/07/2022 0 12    • Basophils Absolute 10/07/2022 0 05    • Sodium 10/07/2022 140    • Potassium 10/07/2022 4 0    • Chloride 10/07/2022 107    • CO2 10/07/2022 26    • ANION GAP 10/07/2022 7    • BUN 10/07/2022 17    • Creatinine 10/07/2022 1 04    • Glucose 10/07/2022 103    • Calcium 10/07/2022 8 7    • AST 10/07/2022 13    • ALT 10/07/2022 18    • Alkaline Phosphatase 10/07/2022 73    • Total Protein 10/07/2022 7 6    • Albumin 10/07/2022 3 7    • Total Bilirubin 10/07/2022 0 41    • eGFR 10/07/2022 67    • hs TnI 0hr 10/07/2022 <2    • Preg, Serum 10/07/2022 Negative    • hs TnI 4hr 10/07/2022 <2    • Delta 4hr hsTnI 10/07/2022           Radiology Results:   No results found

## 2023-01-25 ENCOUNTER — TELEPHONE (OUTPATIENT)
Dept: GASTROENTEROLOGY | Facility: CLINIC | Age: 43
End: 2023-01-25

## 2023-01-25 NOTE — TELEPHONE ENCOUNTER
Patients GI provider:  Luann MUELLER    Number to return call: [phone]    Reason for call: Pt calling because the pharmacy told her the Linzess need authorization      Scheduled procedure/appointment date if applicable: Appt 8/79/45

## 2023-01-26 DIAGNOSIS — K58.1 IRRITABLE BOWEL SYNDROME WITH CONSTIPATION: Primary | ICD-10-CM

## 2023-01-26 RX ORDER — LINACLOTIDE 145 UG/1
145 CAPSULE, GELATIN COATED ORAL
Qty: 16 CAPSULE | Refills: 0 | Status: SHIPPED | COMMUNITY
Start: 2023-01-26

## 2023-01-26 NOTE — TELEPHONE ENCOUNTER
Asked Elizabeth Manjarrez if we can give samples  Elizabeth Manjarrez stated yes    Advised pt we have some samples to  [email or web address]

## 2023-01-26 NOTE — TELEPHONE ENCOUNTER
Pt's insurance denied the Linzess 145g  It states it is denied competely because this not medically necessary  Please advise, thank you

## 2023-01-26 NOTE — TELEPHONE ENCOUNTER
Called and LMOM that I put in a PA for the Linzess and once I hear from the insurance I will giver her a call

## 2023-01-26 NOTE — TELEPHONE ENCOUNTER
Started a PA though covermymeds for Linzess 145 mcg  Key: [id number]  Awaiting insurance determination

## 2023-01-26 NOTE — TELEPHONE ENCOUNTER
----- Message from Vanna Purvis sent at 1/26/2023 12:41 PM EST -----  Regarding: Still waiting for Linzess   Contact: [phone]  Sohail Ms Zaire Juares , how are you ? I still haven’t got the linzess  for my chronic constipation they saying that they requested more information from the doctor and still waiting I didn’t get medicine and I need it so bad it’s been over 7 days since I went to the bathroom   Did insurance  denied the prescription I hope not :/   Charito Harness let me know

## 2023-01-31 ENCOUNTER — TELEPHONE (OUTPATIENT)
Dept: GASTROENTEROLOGY | Facility: CLINIC | Age: 43
End: 2023-01-31

## 2023-01-31 DIAGNOSIS — K58.1 IRRITABLE BOWEL SYNDROME WITH CONSTIPATION: ICD-10-CM

## 2023-01-31 RX ORDER — DICYCLOMINE HCL 20 MG
20 TABLET ORAL EVERY 6 HOURS PRN
Qty: 360 TABLET | Refills: 1 | Status: SHIPPED | OUTPATIENT
Start: 2023-01-31

## 2023-01-31 RX ORDER — FAMOTIDINE 20 MG/1
TABLET, FILM COATED ORAL
Qty: 180 TABLET | Refills: 1 | Status: SHIPPED | OUTPATIENT
Start: 2023-01-31

## 2023-01-31 NOTE — TELEPHONE ENCOUNTER
Called pt to inform her that we just got the approval from her insurance today  She stated that she picked it up form the pharmacy  I apologized that it took them so long to get back to us  She voiced understanding

## 2023-02-20 ENCOUNTER — HOSPITAL ENCOUNTER (OUTPATIENT)
Dept: RADIOLOGY | Facility: HOSPITAL | Age: 43
End: 2023-02-20

## 2023-02-20 ENCOUNTER — HOSPITAL ENCOUNTER (OUTPATIENT)
Dept: MRI IMAGING | Facility: HOSPITAL | Age: 43
Discharge: HOME | End: 2023-02-20

## 2023-02-20 DIAGNOSIS — Q34.9 THORACIC CYST: ICD-10-CM

## 2023-02-20 RX ADMIN — GADOBUTROL 6 ML: 604.72 INJECTION INTRAVENOUS at 14:54

## 2023-02-24 ENCOUNTER — OFFICE VISIT (OUTPATIENT)
Dept: NEUROSURGERY | Facility: CLINIC | Age: 43
End: 2023-02-24

## 2023-02-24 VITALS
WEIGHT: 134 LBS | BODY MASS INDEX: 22.33 KG/M2 | DIASTOLIC BLOOD PRESSURE: 72 MMHG | TEMPERATURE: 97.6 F | OXYGEN SATURATION: 98 % | HEIGHT: 65 IN | SYSTOLIC BLOOD PRESSURE: 100 MMHG | RESPIRATION RATE: 16 BRPM | HEART RATE: 79 BPM

## 2023-02-24 DIAGNOSIS — Q34.9 THORACIC CYST: Primary | ICD-10-CM

## 2023-02-24 NOTE — ASSESSMENT & PLAN NOTE
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  · Had initial evaluation in August 2021  · Neuro exam non-focal     Imaging:  · MRI thoracic spine w/wo, 2/20/23: Stable extradural cystic lesion within the left lateral aspect of the canal at the T4-5 level with mild mass effect upon the thecal sac and slight extension into the neural foramen  No enhancement on contrasted imaging  Plan:  · Reviewed results of imaging with patient  · Discussed with patient that no neurosurgical intervention is anticipated for the extradural cystic lesion at T4-5  · Further follow up could be on a PRN basis should she develop new symptoms  · Patient verbalized understanding and is in agreement with the recommendation

## 2023-02-24 NOTE — PROGRESS NOTES
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  · Had initial evaluation in August 2021  · Neuro exam non-focal     Imaging:  · MRI thoracic spine w/wo, 2/20/23: Stable extradural cystic lesion within the left lateral aspect of the canal at the T4-5 level with mild mass effect upon the thecal sac and slight extension into the neural foramen  No enhancement on contrasted imaging  Plan:  · Reviewed results of imaging with patient  · Discussed with patient that no neurosurgical intervention is anticipated for the extradural cystic lesion at T4-5  · Further follow up could be on a PRN basis should she develop new symptoms  · Patient verbalized understanding and is in agreement with the recommendation  Diagnoses and all orders for this visit:    Thoracic cyst          I have spent a total time of 45 minutes on 02/24/23 in caring for this patient including Diagnostic results, Patient and family education, Impressions, Counseling / Coordination of care, Documenting in the medical record, Reviewing / ordering tests, medicine, procedures  , Obtaining or reviewing history   and Communicating with other healthcare professionals   CHIEF COMPLAINT    No chief complaint on file  HISTORY    History of Present Illness     38y o  year old female     with past medical history of IBS, GERD, fibromyalgia, Lyme disease, who presents for 1  year follow up of a thoracic extradural cyst  This was discovered on MRI completed in 2021 for workup of back pain  At the time, she was experiencing mid back pain with associated occasional numbness and tingling of her arms and legs  She reports that her symptoms have improved since her last visit  She still have occasional generalized pain that she thinks is related to her fibromyalgia but reports that it is mild  She also reports she continues ot have numbness in her hand particularly the left that she related to her carpal tunnel syndrome  She denies any difficulty walking  She denies any bowel or bladder incontinence or chest pain  She denies difficulty with fine motor tasks or clumsiness  She lives at home with her  and 55-year-old daughter  REVIEW OF SYSTEMS    Review of Systems   Constitutional: Negative  HENT: Negative  Eyes: Negative  Respiratory: Negative  Cardiovascular: Negative  Gastrointestinal: Negative  Endocrine: Negative  Genitourinary: Negative  Negative for frequency  Musculoskeletal: Positive for back pain (mid and upper back pain, improved pain/ relates discomfort to fibromyalgis)  Skin: Negative  Neurological: Positive for numbness (and tingling on b/l legs, improved since last OV and hands at times due to carpal tunel)  Negative for dizziness and weakness (hands and leg )  Hematological: Negative  Psychiatric/Behavioral: Negative  ROS obtained by MA  Reviewed  See HPI       Meds/Allergies     Current Outpatient Medications   Medication Sig Dispense Refill   • dicyclomine (BENTYL) 20 mg tablet TAKE 1 TABLET (20 MG TOTAL) BY MOUTH EVERY 6 (SIX) HOURS AS NEEDED (ABDOMINAL PAIN) 360 tablet 1   • famotidine (PEPCID) 20 mg tablet TAKE 1 TABLET BY MOUTH 2 TIMES A DAY AS NEEDED FOR HEARTBURN  180 tablet 1   • naproxen (Naprosyn) 500 mg tablet Take 1 tablet (500 mg total) by mouth 2 (two) times a day with meals 14 tablet 0   • Ascorbic Acid 500 MG CHEW 1 tab(s)     • cholecalciferol (VITAMIN D3) 1,000 units tablet Take 1,000 Units by mouth daily     • cycloSPORINE (RESTASIS) 0 05 % ophthalmic emulsion INSTILL 1 DROP INTO BOTH EYES EVERY TWELVE HOURS     • diclofenac (VOLTAREN) 75 mg EC tablet Take 75 mg by mouth     • estradiol (ESTRACE) 1 mg tablet Take 1 mg by mouth daily     • fluticasone (FLONASE) 50 mcg/act nasal spray 1 spray into each nostril     • gabapentin (NEURONTIN) 300 mg capsule TAKE 1 CAPSULE BY MOUTH EVERY DAY AT NIGHT     • hydrocortisone (ANUSOL-HC) 25 mg suppository Insert 1 suppository (25 mg total) into the rectum daily at bedtime 14 suppository 0   • ibuprofen (MOTRIN) 600 mg tablet TAKE 1 TABLET BY MOUTH EVERY 6 HOURS AS NEEDED FOR MILD PAIN (PAIN SCORE 1-3)  • ibuprofen (MOTRIN) 800 mg tablet TAKE 1 TABLET BY MOUTH EVERY 8 HOURS AS NEEDED FOR MODERATE PAIN  0   • linaCLOtide (Linzess) 145 MCG CAPS Take 1 capsule (145 mcg total) by mouth in the morning 30 capsule 3   • linaCLOtide (Linzess) 145 MCG CAPS Take 1 capsule (145 mcg total) by mouth daily in the early morning 16 capsule 0   • loratadine (CLARITIN) 10 mg tablet Take 10 mg by mouth daily     • magnesium oxide (MAG-OX) 400 mg tablet Take 1 tablet by mouth 2 (two) times a day     • magnesium oxide (MAG-OX) 400 mg Take 1 tablet (400 mg total) by mouth 2 (two) times a day 60 tablet 1   • methocarbamol (ROBAXIN) 500 mg tablet Take 1 tablet (500 mg total) by mouth 2 (two) times a day As needed for pain (Patient not taking: Reported on 2/24/2023) 20 tablet 0   • pantoprazole (PROTONIX) 40 mg tablet Take 1 tablet (40 mg total) by mouth 2 (two) times a day 60 tablet 3   • Riboflavin (VITAMIN B-2 PO) TAKE 2 TABLETS (200 MG TOTAL) BY MOUTH 2 (TWO) TIMES A DAY     • traZODone (DESYREL) 50 mg tablet Take 50 mg by mouth     • triamcinolone (KENALOG) 0 1 % cream Apply 1 application topically 2 (two) times a day To affected area     • triamcinolone (KENALOG) 0 5 % cream Apply 1 application topically 3 (three) times a day     • valACYclovir (VALTREX) 500 mg tablet 1 tab(s)prn     • zinc gluconate 50 mg tablet Take 50 mg by mouth daily       No current facility-administered medications for this visit         Allergies   Allergen Reactions   • Pollen Extract Itching     Sneezing, itching in the eyes       PAST HISTORY    Past Medical History:   Diagnosis Date   • Cholelithiasis    • Colon polyp Past Surgical History:   Procedure Laterality Date   • COLONOSCOPY      less than 5 yrs ago   • COMBINED ABDOMINOPLASTY AND LIPOSUCTION N/A        Social History     Tobacco Use   • Smoking status: Former     Packs/day: 0 50     Types: Cigarettes     Quit date: 2021     Years since quittin 1   • Smokeless tobacco: Never   • Tobacco comments:     vapes   Vaping Use   • Vaping Use: Some days   • Substances: Nicotine, Flavoring   Substance Use Topics   • Alcohol use: Yes     Comment: occ   • Drug use: No       Family History   Problem Relation Age of Onset   • No Known Problems Mother    • Cancer Father         large intestine         Above history personally reviewed  EXAM    Vitals:Blood pressure 100/72, pulse 79, temperature 97 6 °F (36 4 °C), resp  rate 16, height 5' 5" (1 651 m), weight 60 8 kg (134 lb), SpO2 98 %  ,Body mass index is 22 3 kg/m²  Physical Exam  Constitutional:       General: She is not in acute distress  Appearance: She is well-developed  HENT:      Head: Normocephalic and atraumatic  Eyes:      Pupils: Pupils are equal, round, and reactive to light  Neck:      Trachea: No tracheal deviation  Cardiovascular:      Rate and Rhythm: Normal rate  Pulmonary:      Effort: Pulmonary effort is normal    Abdominal:      Palpations: Abdomen is soft  Tenderness: There is no abdominal tenderness  There is no guarding  Musculoskeletal:      Cervical back: Neck supple  Skin:     General: Skin is warm and dry  Coloration: Skin is not pale  Findings: No rash  Neurological:      Mental Status: She is alert and oriented to person, place, and time  Comments: GCS 15, Awake, Alert, Oriented x 3    Motor: BRANCH, strength 5/5 throughout    Sensation:  intact to LT X 4     Coordination: no drift bilateral upper extremities   Psychiatric:         Behavior: Behavior normal          Neurologic Exam     Mental Status   Oriented to person, place, and time  Cranial Nerves     CN III, IV, VI   Pupils are equal, round, and reactive to light  MEDICAL DECISION MAKING    Imaging Studies:     MRI thoracic spine with and without contrast    Result Date: 2/24/2023  Narrative: MRI THORACIC SPINE WITH AND WITHOUT CONTRAST 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, unspecified  COMPARISON:  2/2/2022 TECHNIQUE:  Multiplanar, multisequence imaging of the thoracic spine was performed before and after gadolinium administration     IV Contrast:  6 mL of Gadobutrol injection (SINGLE-DOSE) IMAGE QUALITY:  Diagnostic  FINDINGS: ALIGNMENT:  Normal alignment of the thoracic spine  No compression fracture  No subluxation  No evidence of scoliosis  MARROW SIGNAL:  Normal marrow signal is identified within the visualized bony structures  No discrete marrow lesion  THORACIC CORD:  Normal signal within the thoracic cord  Once again identified is a nonenhancing cystic lesion which appears to be within the extradural space to the left of the thecal sac at the T4-5 level, best seen on series 9 image 12 and series 5 image 6  This is unchanged in size and configuration, following CSF signal on all sequences  Minor mass effect upon the thecal sac without cord compression  There is some degree of extension into the neural foramen displacing the exiting nerve slightly superiorly, unchanged  PREVERTEBRAL AND PARASPINAL SOFT TISSUES:   Normal  THORACIC DEGENERATIVE CHANGE:  No disc herniation, canal stenosis or foraminal narrowing  No degenerative changes  POSTCONTRAST:  No abnormal enhancement  OTHER FINDINGS:  None  Impression: Stable extradural cystic lesion within the left lateral aspect of the canal at the T4-5 level with mild mass effect upon the thecal sac and slight extension into the neural foramen  Workstation performed: [id number]       I have personally reviewed pertinent reports     and I have personally reviewed pertinent films in PACS

## 2023-02-25 ENCOUNTER — APPOINTMENT (EMERGENCY)
Dept: RADIOLOGY | Facility: HOSPITAL | Age: 43
End: 2023-02-25

## 2023-02-25 ENCOUNTER — HOSPITAL ENCOUNTER (EMERGENCY)
Facility: HOSPITAL | Age: 43
Discharge: HOME | End: 2023-02-25
Attending: EMERGENCY MEDICINE | Admitting: EMERGENCY MEDICINE

## 2023-02-25 VITALS
WEIGHT: 134 LBS | HEIGHT: 65 IN | TEMPERATURE: 97.3 F | SYSTOLIC BLOOD PRESSURE: 116 MMHG | BODY MASS INDEX: 22.33 KG/M2 | HEART RATE: 71 BPM | RESPIRATION RATE: 18 BRPM | OXYGEN SATURATION: 96 % | DIASTOLIC BLOOD PRESSURE: 82 MMHG

## 2023-02-25 DIAGNOSIS — R09.89 GLOBUS SENSATION: Primary | ICD-10-CM

## 2023-02-25 NOTE — ED NOTES
Provider at bedside to update and see pt  D/C summary expressed and provided via ER Provider       Irvin Santos RN  02/25/23 [id number]

## 2023-02-25 NOTE — ED PROVIDER NOTES
History  Chief Complaint   Patient presents with   • Swallowed Foreign Body     Reports woke up 2/24 AM with back of lip ring missing, feeling of something stuck in throat, able to handle secretions, no change in voice quality, able to eat and drink with no issues      Patient is a 61-year-old female with no significant past medical history presenting to the emergency department for evaluation of possible metallic foreign body in her throat  She states that she woke up approximately 12 hours ago and noticed that the back of her lip ring was missing  Throughout the day she developed a foreign body sensation in her throat  She is denying sore throat, difficulty swallowing, difficulty breathing, however feels that there is something stuck in her throat  She is not having any fevers, chills, chest pain  No other complaints at this time  Prior to Admission Medications   Prescriptions Last Dose Informant Patient Reported? Taking? Ascorbic Acid 500 MG CHEW   Yes No   Si tab(s)   Riboflavin (VITAMIN B-2 PO)   Yes No   Sig: TAKE 2 TABLETS (200 MG TOTAL) BY MOUTH 2 (TWO) TIMES A DAY   cholecalciferol (VITAMIN D3) 1,000 units tablet   Yes No   Sig: Take 1,000 Units by mouth daily   cycloSPORINE (RESTASIS) 0 05 % ophthalmic emulsion   Yes No   Sig: INSTILL 1 DROP INTO BOTH EYES EVERY TWELVE HOURS   diclofenac (VOLTAREN) 75 mg EC tablet   Yes No   Sig: Take 75 mg by mouth   dicyclomine (BENTYL) 20 mg tablet   No No   Sig: TAKE 1 TABLET (20 MG TOTAL) BY MOUTH EVERY 6 (SIX) HOURS AS NEEDED (ABDOMINAL PAIN)   estradiol (ESTRACE) 1 mg tablet   Yes No   Sig: Take 1 mg by mouth daily   famotidine (PEPCID) 20 mg tablet   No No   Sig: TAKE 1 TABLET BY MOUTH 2 TIMES A DAY AS NEEDED FOR HEARTBURN     fluticasone (FLONASE) 50 mcg/act nasal spray   Yes No   Si spray into each nostril   gabapentin (NEURONTIN) 300 mg capsule   Yes No   Sig: TAKE 1 CAPSULE BY MOUTH EVERY DAY AT NIGHT   hydrocortisone (ANUSOL-HC) 25 mg suppository   No No   Sig: Insert 1 suppository (25 mg total) into the rectum daily at bedtime   ibuprofen (MOTRIN) 600 mg tablet   Yes No   Sig: TAKE 1 TABLET BY MOUTH EVERY 6 HOURS AS NEEDED FOR MILD PAIN (PAIN SCORE 1-3)     ibuprofen (MOTRIN) 800 mg tablet   Yes No   Sig: TAKE 1 TABLET BY MOUTH EVERY 8 HOURS AS NEEDED FOR MODERATE PAIN   linaCLOtide (Linzess) 145 MCG CAPS   No No   Sig: Take 1 capsule (145 mcg total) by mouth in the morning   linaCLOtide (Linzess) 145 MCG CAPS   No No   Sig: Take 1 capsule (145 mcg total) by mouth daily in the early morning   loratadine (CLARITIN) 10 mg tablet   Yes No   Sig: Take 10 mg by mouth daily   magnesium oxide (MAG-OX) 400 mg   No No   Sig: Take 1 tablet (400 mg total) by mouth 2 (two) times a day   magnesium oxide (MAG-OX) 400 mg tablet   Yes No   Sig: Take 1 tablet by mouth 2 (two) times a day   methocarbamol (ROBAXIN) 500 mg tablet   No No   Sig: Take 1 tablet (500 mg total) by mouth 2 (two) times a day As needed for pain   Patient not taking: Reported on 2023   naproxen (Naprosyn) 500 mg tablet   No No   Sig: Take 1 tablet (500 mg total) by mouth 2 (two) times a day with meals   pantoprazole (PROTONIX) 40 mg tablet   No No   Sig: Take 1 tablet (40 mg total) by mouth 2 (two) times a day   traZODone (DESYREL) 50 mg tablet   Yes No   Sig: Take 50 mg by mouth   triamcinolone (KENALOG) 0 1 % cream   Yes No   Sig: Apply 1 application topically 2 (two) times a day To affected area   triamcinolone (KENALOG) 0 5 % cream   Yes No   Sig: Apply 1 application topically 3 (three) times a day   valACYclovir (VALTREX) 500 mg tablet   Yes No   Si tab(s)prn   zinc gluconate 50 mg tablet   Yes No   Sig: Take 50 mg by mouth daily      Facility-Administered Medications: None       Past Medical History:   Diagnosis Date   • Cholelithiasis    • Colon polyp        Past Surgical History:   Procedure Laterality Date   • COLONOSCOPY      less than 5 yrs ago   • COMBINED ABDOMINOPLASTY AND LIPOSUCTION N/A        Family History   Problem Relation Age of Onset   • No Known Problems Mother    • Cancer Father         large intestine     I have reviewed and agree with the history as documented  E-Cigarette/Vaping   • E-Cigarette Use Current Every Day User      E-Cigarette/Vaping Substances   • Nicotine Yes    • THC No    • CBD No    • Flavoring Yes    • Other No    • Unknown No      Social History     Tobacco Use   • Smoking status: Former     Packs/day: 0 50     Types: Cigarettes     Quit date: 2021     Years since quittin 1   • Smokeless tobacco: Never   • Tobacco comments:     vapes   Vaping Use   • Vaping Use: Every day   • Substances: Nicotine, Flavoring   Substance Use Topics   • Alcohol use: Yes     Comment: occ   • Drug use: No       Review of Systems   Constitutional: Negative for chills and fever  HENT: Negative for congestion, facial swelling, nosebleeds, sore throat and voice change  Patient reporting globus sensation   Eyes: Negative for pain and redness  Respiratory: Negative for cough, choking, chest tightness, shortness of breath and stridor  Cardiovascular: Negative for chest pain and palpitations  Gastrointestinal: Negative for abdominal pain, diarrhea, nausea and vomiting  Musculoskeletal: Negative for arthralgias, back pain, myalgias, neck pain and neck stiffness  Skin: Negative for color change and rash  Neurological: Negative for dizziness, syncope, facial asymmetry, weakness, light-headedness, numbness and headaches  Psychiatric/Behavioral: Negative for confusion and suicidal ideas  All other systems reviewed and are negative  Physical Exam  Physical Exam  Vitals reviewed  Constitutional:       General: She is not in acute distress  Appearance: Normal appearance  She is not ill-appearing, toxic-appearing or diaphoretic  HENT:      Head: Normocephalic and atraumatic        Right Ear: External ear normal       Left Ear: External ear normal       Nose: Nose normal  No congestion or rhinorrhea  Mouth/Throat:      Mouth: Mucous membranes are moist       Pharynx: Oropharynx is clear  No oropharyngeal exudate or posterior oropharyngeal erythema  Eyes:      General: No scleral icterus  Right eye: No discharge  Left eye: No discharge  Extraocular Movements: Extraocular movements intact  Conjunctiva/sclera: Conjunctivae normal    Cardiovascular:      Rate and Rhythm: Normal rate and regular rhythm  Pulses: Normal pulses  Heart sounds: Normal heart sounds  No murmur heard  No friction rub  No gallop  Pulmonary:      Effort: Pulmonary effort is normal  No respiratory distress  Breath sounds: Normal breath sounds  No stridor  No wheezing, rhonchi or rales  Musculoskeletal:      Cervical back: Normal range of motion and neck supple  Right lower leg: No edema  Left lower leg: No edema  Skin:     General: Skin is warm and dry  Capillary Refill: Capillary refill takes less than 2 seconds  Neurological:      General: No focal deficit present  Mental Status: She is alert and oriented to person, place, and time  Psychiatric:         Mood and Affect: Mood normal          Behavior: Behavior normal          Vital Signs  ED Triage Vitals [02/25/23 0016]   Temperature Pulse Respirations Blood Pressure SpO2   (!) 97 3 °F (36 3 °C) 71 18 116/82 96 %      Temp Source Heart Rate Source Patient Position - Orthostatic VS BP Location FiO2 (%)   Tympanic -- -- -- --      Pain Score       No Pain           Vitals:    02/25/23 0016   BP: 116/82   Pulse: 71         Visual Acuity      ED Medications  Medications - No data to display    Diagnostic Studies  Results Reviewed     None                 XR neck soft tissue   Final Result by Leidy Suarez MD (02/25 1012)      Unremarkable neck soft tissue radiographs        Workstation performed: [id number]                    Procedures  Procedures         ED Course                                             Medical Decision Making  Patient presenting for potential foreign body lodged in her throat  Patient reports globus sensation  She is concerned that the metallic back of her lip ring is stuck in her throat  Upon arrival patient appears comfortable  She is not in any acute distress  No abnormalities visualized in the posterior pharynx upon my assessment  XR soft tissue neck obtained to assess for foreign body and did not reveal any abnormalities  Symptoms possibly secondary to scratched esophagus from swallowed tongue ring, however ring is not lodged in the pharynx  Patient was discharged home with strict return precautions  Currently in stable condition  PCP follow up encouraged  Globus sensation: acute illness or injury  Amount and/or Complexity of Data Reviewed  Radiology: ordered  Disposition  Final diagnoses:   Globus sensation     Time reflects when diagnosis was documented in both MDM as applicable and the Disposition within this note     Time User Action Codes Description Comment    2/25/2023  1:45 AM Owen Eastern Add [R09 [id number]] Globus sensation       ED Disposition     ED Disposition   Discharge    Condition   Stable    Date/Time   Sat Feb 25, 2023  1:45 AM    Comment   Lauro Obregon discharge to home/self care                 Follow-up Information     Follow up With Specialties Details Why Contact Info Additional [address] [facility] Emergency Department Emergency Medicine Go to  If symptoms worsen [address] Emergency Department, [address] [place] [address], [facility]    Diana Travis MD  Schedule an appointment as soon as possible for a visit  for follow up [address] [place] [address]  [phone]             Discharge Medication List as of 2/25/2023  1:47 AM      CONTINUE these medications which have NOT CHANGED    Details   dicyclomine (BENTYL) 20 mg tablet TAKE 1 TABLET (20 MG TOTAL) BY MOUTH EVERY 6 (SIX) HOURS AS NEEDED (ABDOMINAL PAIN), Starting Tue 1/31/2023, Normal      famotidine (PEPCID) 20 mg tablet TAKE 1 TABLET BY MOUTH 2 TIMES A DAY AS NEEDED FOR HEARTBURN , Normal      Ascorbic Acid 500 MG CHEW 1 tab(s), Historical Med      cholecalciferol (VITAMIN D3) 1,000 units tablet Take 1,000 Units by mouth daily, Historical Med      cycloSPORINE (RESTASIS) 0 05 % ophthalmic emulsion INSTILL 1 DROP INTO BOTH EYES EVERY TWELVE HOURS, Historical Med      diclofenac (VOLTAREN) 75 mg EC tablet Take 75 mg by mouth, Starting Tue 5/24/2022, Until Wed 5/24/2023 at 2359, Historical Med      estradiol (ESTRACE) 1 mg tablet Take 1 mg by mouth daily, Starting Tue 1/17/2023, Historical Med      fluticasone (FLONASE) 50 mcg/act nasal spray 1 spray into each nostril, Starting Tue 3/5/2019, Until Fri 2/18/2022 at 2359, Historical Med      gabapentin (NEURONTIN) 300 mg capsule TAKE 1 CAPSULE BY MOUTH EVERY DAY AT NIGHT, Historical Med      hydrocortisone (ANUSOL-HC) 25 mg suppository Insert 1 suppository (25 mg total) into the rectum daily at bedtime, Starting Wed 1/27/2021, Normal      !! ibuprofen (MOTRIN) 600 mg tablet TAKE 1 TABLET BY MOUTH EVERY 6 HOURS AS NEEDED FOR MILD PAIN (PAIN SCORE 1-3)  , Historical Med      !! ibuprofen (MOTRIN) 800 mg tablet TAKE 1 TABLET BY MOUTH EVERY 8 HOURS AS NEEDED FOR MODERATE PAIN, Historical Med      !! linaCLOtide (Linzess) 145 MCG CAPS Take 1 capsule (145 mcg total) by mouth in the morning, Starting Mon 1/23/2023, Normal      !! linaCLOtide (Linzess) 145 MCG CAPS Take 1 capsule (145 mcg total) by mouth daily in the early morning, Starting Thu 1/26/2023, Sample      loratadine (CLARITIN) 10 mg tablet Take 10 mg by mouth daily, Starting Wed 8/17/2022, Historical Med      magnesium oxide (MAG-OX) 400 mg tablet Take 1 tablet by mouth 2 (two) times a day, Starting Tue 9/1/2020, Historical Med magnesium oxide (MAG-OX) 400 mg Take 1 tablet (400 mg total) by mouth 2 (two) times a day, Starting Tue 9/1/2020, Normal      methocarbamol (ROBAXIN) 500 mg tablet Take 1 tablet (500 mg total) by mouth 2 (two) times a day As needed for pain, Starting Thu 3/10/2022, Normal      naproxen (Naprosyn) 500 mg tablet Take 1 tablet (500 mg total) by mouth 2 (two) times a day with meals, Starting Fri 10/7/2022, Normal      pantoprazole (PROTONIX) 40 mg tablet Take 1 tablet (40 mg total) by mouth 2 (two) times a day, Starting Wed 1/5/2022, Normal      Riboflavin (VITAMIN B-2 PO) TAKE 2 TABLETS (200 MG TOTAL) BY MOUTH 2 (TWO) TIMES A DAY, Historical Med      traZODone (DESYREL) 50 mg tablet Take 50 mg by mouth, Starting Wed 9/7/2022, Until Fri 10/7/2022 at 2359, Historical Med      triamcinolone (KENALOG) 0 1 % cream Apply 1 application topically 2 (two) times a day To affected area, Starting Tue 11/15/2022, Historical Med      triamcinolone (KENALOG) 0 5 % cream Apply 1 application topically 3 (three) times a day, Starting Fri 6/10/2022, Historical Med      valACYclovir (VALTREX) 500 mg tablet 1 tab(s)prn, Historical Med      zinc gluconate 50 mg tablet Take 50 mg by mouth daily, Historical Med       !! - Potential duplicate medications found  Please discuss with provider  No discharge procedures on file      PDMP Review     None          ED Provider  Electronically Signed by           Amanda Granado PA-C  02/27/23 0177

## 2023-03-08 RX ORDER — METRONIDAZOLE 7.5 MG/G
GEL VAGINAL
COMMUNITY
Start: 2023-01-26

## 2023-03-08 RX ORDER — ONDANSETRON 4 MG/1
TABLET, FILM COATED ORAL
COMMUNITY
Start: 2023-01-26

## 2023-03-08 RX ORDER — DEXTROMETHORPHAN HYDROBROMIDE AND PROMETHAZINE HYDROCHLORIDE 15; 6.25 MG/5ML; MG/5ML
SYRUP ORAL
COMMUNITY
Start: 2023-01-24

## 2023-03-13 ENCOUNTER — OFFICE VISIT (OUTPATIENT)
Dept: GASTROENTEROLOGY | Facility: CLINIC | Age: 43
End: 2023-03-13

## 2023-03-13 VITALS
BODY MASS INDEX: 22.99 KG/M2 | HEART RATE: 72 BPM | DIASTOLIC BLOOD PRESSURE: 78 MMHG | WEIGHT: 138 LBS | SYSTOLIC BLOOD PRESSURE: 112 MMHG | HEIGHT: 65 IN

## 2023-03-13 DIAGNOSIS — K58.1 IRRITABLE BOWEL SYNDROME WITH CONSTIPATION: Primary | ICD-10-CM

## 2023-03-13 DIAGNOSIS — K21.9 GASTROESOPHAGEAL REFLUX DISEASE, UNSPECIFIED WHETHER ESOPHAGITIS PRESENT: ICD-10-CM

## 2023-03-13 NOTE — PROGRESS NOTES
[facility] - Outpatient Follow-up Note  Israel Aviles 38 y o  female MRN: [id number]  Encounter: [id number]          ASSESSMENT AND PLAN:      1  Irritable bowel syndrome with constipation  Miralax causes bloating and gas  Amitiza causes nausea  Linzess causes diarrhea, bloating and gas  She is understandably frustrated by her ongoing symptoms    She came across a supplement line called [employer] and she purchased several products including fiber, fennel and peppermint oil  She is going to trial these    Could consider trial of Trulance    2  Gastroesophageal reflux disease, unspecified whether esophagitis present  Still problematic despite Famotidine  PPIs caused headaches    ______________________________________________________________________    SUBJECTIVE: 31-year-old female with a history of constipation predominant irritable bowel syndrome and GERD who presents for routine follow-up  At her last appointment we agreed to trial Linzess 142 mcg daily  Unfortunately, this caused excessive gaseousness and bloating with loose stools to diarrhea  Prior to this she had trialed MiraLAX which caused gas and bloating and Amitiza which caused nausea  She recently came across a supplement line called [email or web address]  She purchased several products including fennel tea, fiber and peppermint oil  She just received some and is going to see if they improve any of her symptoms  She has tried to improve her diet and drink more water  She continues with heartburn  She trialed famotidine however this did not help  Unfortunately, she had headaches with several different proton pump inhibitors  REVIEW OF SYSTEMS IS OTHERWISE NEGATIVE        Historical Information   Past Medical History:   Diagnosis Date   • Cholelithiasis    • Colon polyp      Past Surgical History:   Procedure Laterality Date   • COLONOSCOPY      less than 5 yrs ago   • COMBINED ABDOMINOPLASTY AND LIPOSUCTION N/A    • HYSTERECTOMY       Social History   Social History     Substance and Sexual Activity   Alcohol Use Yes    Comment: occ     Social History     Substance and Sexual Activity   Drug Use No     Social History     Tobacco Use   Smoking Status Former   • Packs/day: 0 50   • Types: Cigarettes   • Quit date: 2021   • Years since quittin 1   Smokeless Tobacco Never   Tobacco Comments    vapes     Family History   Problem Relation Age of Onset   • No Known Problems Mother    • Cancer Father         large intestine       Meds/Allergies       Current Outpatient Medications:   •  Ascorbic Acid 500 MG CHEW  •  cholecalciferol (VITAMIN D3) 1,000 units tablet  •  cycloSPORINE (RESTASIS) 0 05 % ophthalmic emulsion  •  diclofenac (VOLTAREN) 75 mg EC tablet  •  dicyclomine (BENTYL) 20 mg tablet  •  estradiol (ESTRACE) 1 mg tablet  •  famotidine (PEPCID) 20 mg tablet  •  gabapentin (NEURONTIN) 300 mg capsule  •  hydrocortisone (ANUSOL-HC) 25 mg suppository  •  ibuprofen (MOTRIN) 600 mg tablet  •  ibuprofen (MOTRIN) 800 mg tablet  •  linaCLOtide (Linzess) 145 MCG CAPS  •  linaCLOtide (Linzess) 145 MCG CAPS  •  loratadine (CLARITIN) 10 mg tablet  •  magnesium oxide (MAG-OX) 400 mg tablet  •  magnesium oxide (MAG-OX) 400 mg  •  methocarbamol (ROBAXIN) 500 mg tablet  •  metroNIDAZOLE (METROGEL) 0 75 % vaginal gel  •  naproxen (Naprosyn) 500 mg tablet  •  ondansetron (ZOFRAN) 4 mg tablet  •  pantoprazole (PROTONIX) 40 mg tablet  •  promethazine-dextromethorphan (PHENERGAN-DM) 6 25-15 mg/5 mL oral syrup  •  Riboflavin (VITAMIN B-2 PO)  •  triamcinolone (KENALOG) 0 1 % cream  •  triamcinolone (KENALOG) 0 5 % cream  •  valACYclovir (VALTREX) 500 mg tablet  •  zinc gluconate 50 mg tablet  •  fluticasone (FLONASE) 50 mcg/act nasal spray  •  traZODone (DESYREL) 50 mg tablet    Allergies   Allergen Reactions   • Pollen Extract Itching     Sneezing, itching in the eyes           Objective     Blood pressure 112/78, pulse 72, height 5' 5" (1 651 m), weight 62 6 kg (138 lb), last menstrual period 09/15/2022  Body mass index is 22 96 kg/m²  PHYSICAL EXAM:      General Appearance:   Alert, cooperative, no distress   HEENT:   Normocephalic, atraumatic, anicteric      Neck:  Supple, symmetrical, trachea midline   Lungs:   Clear to auscultation bilaterally; no rales, rhonchi or wheezing; respirations unlabored    Heart[de-identified]   Regular rate and rhythm; no murmur, rub, or gallop  Abdomen:   Soft, non-tender, non-distended; normal bowel sounds; no masses, no organomegaly    Genitalia:   Deferred    Rectal:   Deferred    Extremities:  No cyanosis, clubbing or edema    Pulses:  2+ and symmetric    Skin:  No jaundice, rashes, or lesions    Lymph nodes:  No palpable cervical lymphadenopathy        Lab Results:   No visits with results within 1 Day(s) from this visit     Latest known visit with results is:   Admission on 10/07/2022, Discharged on 10/07/2022   Component Date Value   • Ventricular Rate 10/07/2022 77    • Atrial Rate 10/07/2022 77    • FL Interval 10/07/2022 146    • QRSD Interval 10/07/2022 82    • QT Interval 10/07/2022 370    • QTC Interval 10/07/2022 418    • P Axis 10/07/2022 74    • QRS Axis 10/07/2022 76    • T Wave Axis 10/07/2022 66    • WBC 10/07/2022 9 17    • RBC 10/07/2022 4 36    • Hemoglobin 10/07/2022 13 2    • Hematocrit 10/07/2022 40 1    • MCV 10/07/2022 92    • MCH 10/07/2022 30 3    • MCHC 10/07/2022 32 9    • RDW 10/07/2022 12 8    • MPV 10/07/2022 11 0    • Platelets 50/42/5882 211    • nRBC 10/07/2022 0    • Neutrophils Relative 10/07/2022 72    • Immat GRANS % 10/07/2022 0    • Lymphocytes Relative 10/07/2022 20    • Monocytes Relative 10/07/2022 6    • Eosinophils Relative 10/07/2022 1    • Basophils Relative 10/07/2022 1    • Neutrophils Absolute 10/07/2022 6 56    • Immature Grans Absolute 10/07/2022 0 02    • Lymphocytes Absolute 10/07/2022 1 87    • Monocytes Absolute 10/07/2022 0 55    • Eosinophils Absolute 10/07/2022 0 12    • Basophils Absolute 10/07/2022 0 05    • Sodium 10/07/2022 140    • Potassium 10/07/2022 4 0    • Chloride 10/07/2022 107    • CO2 10/07/2022 26    • ANION GAP 10/07/2022 7    • BUN 10/07/2022 17    • Creatinine 10/07/2022 1 04    • Glucose 10/07/2022 103    • Calcium 10/07/2022 8 7    • AST 10/07/2022 13    • ALT 10/07/2022 18    • Alkaline Phosphatase 10/07/2022 73    • Total Protein 10/07/2022 7 6    • Albumin 10/07/2022 3 7    • Total Bilirubin 10/07/2022 0 41    • eGFR 10/07/2022 67    • hs TnI 0hr 10/07/2022 <2    • Preg, Serum 10/07/2022 Negative    • hs TnI 4hr 10/07/2022 <2    • Delta 4hr hsTnI 10/07/2022           Radiology Results:   XR neck soft tissue    Result Date: 2/25/2023  Narrative: NECK SOFT TISSUES INDICATION:   metallic foreign body  COMPARISON:  Correlation is made with the prior CT study dated 3/10/2022  VIEWS:  XR NECK SOFT TISSUE FINDINGS: The epiglottis and aryepiglottic folds are normal  The adenoids and palatine tonsils are not enlarged  No radiopaque foreign body  There is no evidence of an osseous abnormality, although this study is not optimized for evaluation of the cervical spine  Impression: Unremarkable neck soft tissue radiographs  Workstation performed: [id number]     MRI thoracic spine with and without contrast    Result Date: 2/24/2023  Narrative: MRI THORACIC SPINE WITH AND WITHOUT CONTRAST 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, unspecified  COMPARISON:  2/2/2022 TECHNIQUE:  Multiplanar, multisequence imaging of the thoracic spine was performed before and after gadolinium administration     IV Contrast:  6 mL of Gadobutrol injection (SINGLE-DOSE) IMAGE QUALITY:  Diagnostic  FINDINGS: ALIGNMENT:  Normal alignment of the thoracic spine  No compression fracture  No subluxation  No evidence of scoliosis  MARROW SIGNAL:  Normal marrow signal is identified within the visualized bony structures  No discrete marrow lesion  THORACIC CORD:  Normal signal within the thoracic cord  Once again identified is a nonenhancing cystic lesion which appears to be within the extradural space to the left of the thecal sac at the T4-5 level, best seen on series 9 image 12 and series 5 image 6  This is unchanged in size and configuration, following CSF signal on all sequences  Minor mass effect upon the thecal sac without cord compression  There is some degree of extension into the neural foramen displacing the exiting nerve slightly superiorly, unchanged  PREVERTEBRAL AND PARASPINAL SOFT TISSUES:   Normal  THORACIC DEGENERATIVE CHANGE:  No disc herniation, canal stenosis or foraminal narrowing  No degenerative changes  POSTCONTRAST:  No abnormal enhancement  OTHER FINDINGS:  None  Impression: 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

## 2023-06-28 ENCOUNTER — OFFICE VISIT (OUTPATIENT)
Dept: GASTROENTEROLOGY | Facility: CLINIC | Age: 43
End: 2023-06-28
Payer: COMMERCIAL

## 2023-06-28 VITALS
BODY MASS INDEX: 22.66 KG/M2 | OXYGEN SATURATION: 98 % | HEART RATE: 79 BPM | WEIGHT: 136 LBS | HEIGHT: 65 IN | SYSTOLIC BLOOD PRESSURE: 108 MMHG | DIASTOLIC BLOOD PRESSURE: 66 MMHG

## 2023-06-28 DIAGNOSIS — R10.10 PAIN OF UPPER ABDOMEN: Primary | ICD-10-CM

## 2023-06-28 PROCEDURE — 99213 OFFICE O/P EST LOW 20 MIN: CPT | Performed by: PHYSICIAN ASSISTANT

## 2023-06-28 NOTE — PROGRESS NOTES
[facility] - Outpatient Follow-up Note  Angeline Quintero 41 y o  female MRN: [id number]  Encounter: [id number]          ASSESSMENT AND PLAN:      1  Pain of upper abdomen  She has GERD and IBS-C but both of these are under good control  She notes a new and different pain of her upper abdomen with significant back pain  She is worried about her pancreas     Will check labs, stool and UA  Given FD guard  Continue Famotidine    She saw her PCP who believes she is having a flair of her Fibromyalgia  She has an appt with pain management on MOnday    ______________________________________________________________________    SUBJECTIVE: 46-year-old female with GERD and irritable bowel syndrome who presents for evaluation of new she reports that for the past few weeks she has been having upper abdominal pain  She reports that it is unassociated with eating and constant throughout the day  She reports that it can be quite intense  Her bigger complaint is actually back pain  She has had back pain in the past and has a diagnosis of fibromyalgia but reports that this is a different pain than she has ever had before  She reports that is associated with numbness of her arm and down her leg  She saw her family doctor several days ago who thinks she is having a flare of her fibromyalgia  He made her an appointment with pain management for early next week  She reports that her bowel movements are under good control  She is not actually taking anything for this right now  She reports that her acid reflux is under control  She is taking famotidine 20 mg daily  She is very worried about the possibility of her pancreas being disrupted or H  pylori  Ultrasound of her abdomen from January appeared normal         REVIEW OF SYSTEMS IS OTHERWISE NEGATIVE        Historical Information   Past Medical History:   Diagnosis Date   • Cholelithiasis    • Colon polyp      Past Surgical History:   Procedure "Laterality Date   • COLONOSCOPY      less than 5 yrs ago   • COMBINED ABDOMINOPLASTY AND LIPOSUCTION N/A    • HYSTERECTOMY       Social History   Social History     Substance and Sexual Activity   Alcohol Use Yes    Comment: occ     Social History     Substance and Sexual Activity   Drug Use No     Social History     Tobacco Use   Smoking Status Former   • Packs/day: 0 50   • Types: Cigarettes   • Quit date: 2021   • Years since quittin 4   Smokeless Tobacco Never   Tobacco Comments    vapes     Family History   Problem Relation Age of Onset   • No Known Problems Mother    • Cancer Father         large intestine       Meds/Allergies       Current Outpatient Medications:   •  Ascorbic Acid 500 MG CHEW  •  cholecalciferol (VITAMIN D3) 1,000 units tablet  •  diclofenac (VOLTAREN) 75 mg EC tablet  •  dicyclomine (BENTYL) 20 mg tablet  •  estradiol (ESTRACE) 1 mg tablet  •  famotidine (PEPCID) 20 mg tablet  •  fluticasone (FLONASE) 50 mcg/act nasal spray  •  gabapentin (NEURONTIN) 300 mg capsule  •  ibuprofen (MOTRIN) 600 mg tablet  •  linaCLOtide (Linzess) 145 MCG CAPS  •  loratadine (CLARITIN) 10 mg tablet  •  magnesium oxide (MAG-OX) 400 mg  •  ondansetron (ZOFRAN) 4 mg tablet  •  promethazine-dextromethorphan (PHENERGAN-DM) 6 25-15 mg/5 mL oral syrup  •  Riboflavin (VITAMIN B-2 PO)  •  traZODone (DESYREL) 50 mg tablet  •  triamcinolone (KENALOG) 0 1 % cream  •  triamcinolone (KENALOG) 0 5 % cream  •  valACYclovir (VALTREX) 500 mg tablet  •  zinc gluconate 50 mg tablet    Allergies   Allergen Reactions   • Pollen Extract Itching     Sneezing, itching in the eyes           Objective     Blood pressure 108/66, pulse 79, height 5' 5\" (1 651 m), weight 61 7 kg (136 lb), last menstrual period 09/15/2022, SpO2 98 %  Body mass index is 22 63 kg/m²        PHYSICAL EXAM:      General Appearance:   Alert, cooperative, no distress   HEENT:   Normocephalic, atraumatic, anicteric      Neck:  Supple, symmetrical, trachea " midline   Lungs:   Clear to auscultation bilaterally; no rales, rhonchi or wheezing; respirations unlabored    Heart[de-identified]   Regular rate and rhythm; no murmur, rub, or gallop  Abdomen:   Soft, non-tender, non-distended; normal bowel sounds; no masses, no organomegaly    Genitalia:   Deferred    Rectal:   Deferred    Extremities:  No cyanosis, clubbing or edema    Pulses:  2+ and symmetric    Skin:  No jaundice, rashes, or lesions    Lymph nodes:  No palpable cervical lymphadenopathy        Lab Results:   No visits with results within 1 Day(s) from this visit     Latest known visit with results is:   Admission on 10/07/2022, Discharged on 10/07/2022   Component Date Value   • Ventricular Rate 10/07/2022 77    • Atrial Rate 10/07/2022 77    • FL Interval 10/07/2022 146    • QRSD Interval 10/07/2022 82    • QT Interval 10/07/2022 370    • QTC Interval 10/07/2022 418    • P Axis 10/07/2022 74    • QRS Axis 10/07/2022 76    • T Wave Axis 10/07/2022 66    • WBC 10/07/2022 9 17    • RBC 10/07/2022 4 36    • Hemoglobin 10/07/2022 13 2    • Hematocrit 10/07/2022 40 1    • MCV 10/07/2022 92    • MCH 10/07/2022 30 3    • MCHC 10/07/2022 32 9    • RDW 10/07/2022 12 8    • MPV 10/07/2022 11 0    • Platelets 70/23/7634 211    • nRBC 10/07/2022 0    • Neutrophils Relative 10/07/2022 72    • Immat GRANS % 10/07/2022 0    • Lymphocytes Relative 10/07/2022 20    • Monocytes Relative 10/07/2022 6    • Eosinophils Relative 10/07/2022 1    • Basophils Relative 10/07/2022 1    • Neutrophils Absolute 10/07/2022 6 56    • Immature Grans Absolute 10/07/2022 0 02    • Lymphocytes Absolute 10/07/2022 1 87    • Monocytes Absolute 10/07/2022 0 55    • Eosinophils Absolute 10/07/2022 0 12    • Basophils Absolute 10/07/2022 0 05    • Sodium 10/07/2022 140    • Potassium 10/07/2022 4 0    • Chloride 10/07/2022 107    • CO2 10/07/2022 26    • ANION GAP 10/07/2022 7    • BUN 10/07/2022 17    • Creatinine 10/07/2022 1 04    • Glucose 10/07/2022 103 • Calcium 10/07/2022 8 7    • AST 10/07/2022 13    • ALT 10/07/2022 18    • Alkaline Phosphatase 10/07/2022 73    • Total Protein 10/07/2022 7 6    • Albumin 10/07/2022 3 7    • Total Bilirubin 10/07/2022 0 41    • eGFR 10/07/2022 67    • hs TnI 0hr 10/07/2022 <2    • Preg, Serum 10/07/2022 Negative    • hs TnI 4hr 10/07/2022 <2    • Delta 4hr hsTnI 10/07/2022           Radiology Results:   No results found  Answers for HPI/ROS submitted by the patient on 6/27/2023  Chronicity: new  Onset quality: gradual  Frequency: daily  Pain quality: aching, cramping, sharp  Radiates to: LUQ, RUQ, epigastric region, left shoulder, right shoulder, chest, back, left flank, right flank  arthralgias: Yes  frequency: Yes  headaches: Yes  hematochezia: No  hematuria: No  melena: No  myalgias: Yes  nausea:  Yes  vomiting: No  Relieved by: nothing

## 2023-07-05 ENCOUNTER — TELEPHONE (OUTPATIENT)
Dept: GASTROENTEROLOGY | Facility: CLINIC | Age: 43
End: 2023-07-05

## 2023-07-05 NOTE — TELEPHONE ENCOUNTER
Received patient labs. Will scan into patients chart and leave in [facility] folder.

## 2023-07-07 ENCOUNTER — OFFICE VISIT (OUTPATIENT)
Dept: OBGYN CLINIC | Facility: CLINIC | Age: 43
End: 2023-07-07
Payer: COMMERCIAL

## 2023-07-07 VITALS
DIASTOLIC BLOOD PRESSURE: 78 MMHG | HEIGHT: 65 IN | BODY MASS INDEX: 22.39 KG/M2 | HEART RATE: 87 BPM | SYSTOLIC BLOOD PRESSURE: 114 MMHG | WEIGHT: 134.4 LBS

## 2023-07-07 DIAGNOSIS — G56.03 CARPAL TUNNEL SYNDROME, BILATERAL: Primary | ICD-10-CM

## 2023-07-07 PROCEDURE — 76942 ECHO GUIDE FOR BIOPSY: CPT | Performed by: FAMILY MEDICINE

## 2023-07-07 PROCEDURE — 99213 OFFICE O/P EST LOW 20 MIN: CPT | Performed by: FAMILY MEDICINE

## 2023-07-07 PROCEDURE — 20526 THER INJECTION CARP TUNNEL: CPT | Performed by: FAMILY MEDICINE

## 2023-07-07 RX ORDER — BUPIVACAINE HYDROCHLORIDE 2.5 MG/ML
1 INJECTION, SOLUTION INFILTRATION; PERINEURAL
Status: COMPLETED | OUTPATIENT
Start: 2023-07-07 | End: 2023-07-07

## 2023-07-07 RX ORDER — BUPIVACAINE HYDROCHLORIDE 2.5 MG/ML
0.5 INJECTION, SOLUTION INFILTRATION; PERINEURAL
Status: COMPLETED | OUTPATIENT
Start: 2023-07-07 | End: 2023-07-07

## 2023-07-07 RX ORDER — TRIAMCINOLONE ACETONIDE 40 MG/ML
20 INJECTION, SUSPENSION INTRA-ARTICULAR; INTRAMUSCULAR
Status: COMPLETED | OUTPATIENT
Start: 2023-07-07 | End: 2023-07-07

## 2023-07-07 RX ADMIN — BUPIVACAINE HYDROCHLORIDE 1 ML: 2.5 INJECTION, SOLUTION INFILTRATION; PERINEURAL at 14:00

## 2023-07-07 RX ADMIN — BUPIVACAINE HYDROCHLORIDE 0.5 ML: 2.5 INJECTION, SOLUTION INFILTRATION; PERINEURAL at 14:00

## 2023-07-07 RX ADMIN — TRIAMCINOLONE ACETONIDE 20 MG: 40 INJECTION, SUSPENSION INTRA-ARTICULAR; INTRAMUSCULAR at 14:00

## 2023-07-07 NOTE — PROGRESS NOTES
Assessment/Plan:  Assessment/Plan   Diagnoses and all orders for this visit:    Carpal tunnel syndrome, bilateral  -     Cock Up Wrist Splint  -     Hand/upper extremity injection: bilateral carpal tunnel      51-year-old right-hand-dominant female with pain and numbness both wrist and hands more than few years duration. Clinical impression is that she has symptoms from carpal tunnel syndrome. I discussed treatment regimen of steroid injection and splinting at night to which she agreed. I administered mixtures 0.5 cc 0.25% bupivacaine and 0.5 cc Kenalog to bilateral carpal tunnel without complication. I provided a cock-up splint for her to wear at nighttime. She will follow-up as needed. Subjective:   Patient ID: Scottie Burton is a 41 y.o. female. Chief Complaint   Patient presents with   • Right Hand - Pain, Numbness   • Left Hand - Pain, Numbness       51-year-old right-hand-dominant female presents for evaluation recurrence of pain and numbness both wrists and hands. She reports have been diagnosed with carpal tunnel syndrome and had steroid injections more than few years ago. She reports that following injection symptoms significantly improved. She reports worsening of symptoms over the past several months. She has pain described as gradual in onset, generalized to the wrist and radiating to the hands, associated numbness and tingling in the digits, bothersome at nighttime, worse with gripping activities, and improved with resting. Hand Pain  This is a recurrent problem. The current episode started more than 1 month ago. The problem occurs daily. The problem has been gradually worsening. Associated symptoms include arthralgias and numbness. Pertinent negatives include no joint swelling or weakness. Exacerbated by: Gripping, nighttime. She has tried rest and position changes for the symptoms. The treatment provided mild relief.              Review of Systems   Musculoskeletal: Positive for arthralgias. Negative for joint swelling. Neurological: Positive for numbness. Negative for weakness. Objective:  Vitals:    07/07/23 1344   BP: 114/78   Pulse: 87   Weight: 61 kg (134 lb 6.4 oz)   Height: 5' 5" (1.651 m)     Ortho Exam    Physical Exam  Vitals and nursing note reviewed. Constitutional:       General: She is not in acute distress. Appearance: She is well-developed. She is not ill-appearing or diaphoretic. HENT:      Head: Normocephalic and atraumatic. Right Ear: External ear normal.      Left Ear: External ear normal.   Eyes:      Conjunctiva/sclera: Conjunctivae normal.   Neck:      Trachea: No tracheal deviation. Cardiovascular:      Rate and Rhythm: Normal rate. Pulmonary:      Effort: Pulmonary effort is normal. No respiratory distress. Abdominal:      General: There is no distension. Skin:     General: Skin is warm and dry. Coloration: Skin is not jaundiced or pale. Neurological:      Mental Status: She is alert and oriented to person, place, and time. Psychiatric:         Mood and Affect: Mood normal.         Behavior: Behavior normal.         Thought Content: Thought content normal.         Judgment: Judgment normal.         Hand/upper extremity injection: bilateral carpal tunnel  [place] Protocol:  Consent: Verbal consent obtained. Risks and benefits: risks, benefits and alternatives were discussed  Consent given by: patient  Time out: Immediately prior to procedure a "time out" was called to verify the correct patient, procedure, equipment, support staff and site/side marked as required.   Patient understanding: patient states understanding of the procedure being performed  Patient consent: the patient's understanding of the procedure matches consent given  Procedure consent: procedure consent matches procedure scheduled  Relevant documents: relevant documents present and verified  Test results: test results available and properly labeled  Site marked: the operative site was marked  Radiology Images displayed and confirmed.  If images not available, report reviewed: imaging studies available  Required items: required blood products, implants, devices, and special equipment available  Patient identity confirmed: verbally with patient    Supporting Documentation  Indications: pain   Procedure Details  Condition:carpal tunnel syndrome Site: bilateral carpal tunnel   Preparation: Patient was prepped and draped in the usual sterile fashion  Needle size: 27 G  Ultrasound guidance: yes  Approach: radial    Medications (Right): 0.5 mL bupivacaine 0.25 %; 1 mL bupivacaine 0.25 %; 20 mg triamcinolone acetonide 40 mg/mLMedications (Left): 0.5 mL bupivacaine 0.25 %; 1 mL bupivacaine 0.25 %; 20 mg triamcinolone acetonide 40 mg/mL   Patient tolerance: patient tolerated the procedure well with no immediate complications  Dressing:  Sterile dressing applied

## 2023-07-30 DIAGNOSIS — K58.1 IRRITABLE BOWEL SYNDROME WITH CONSTIPATION: ICD-10-CM

## 2023-07-31 RX ORDER — FAMOTIDINE 20 MG/1
TABLET, FILM COATED ORAL
Qty: 180 TABLET | Refills: 1 | Status: SHIPPED | OUTPATIENT
Start: 2023-07-31

## 2024-03-20 NOTE — TELEPHONE ENCOUNTER
Patient called the office requesting to see a Neurosurgeon but wanted to make sure we accept her Insurance  She has [facility], which we take  She will just need to ask for insurance referral once she has an appointment with our office  She is being referred by Suzann Angelucci, MD (Spine Specialist) for Mid Thoracic pain  She has an MRI Thoracic completed 4/30/21 at [facility]  Report is in [place] but she does not have a disc  She was advised to call [facility] and ask for a disc  She was also advised to call Dr Joana Queen office for them to fax over records including a referral to neurosurgery  She will contact Dr Joana Queen office right away to get that information to our office  Once information is received we will contact patient to complete intake 
Full
Full

## 2025-01-03 ENCOUNTER — HOSPITAL ENCOUNTER (EMERGENCY)
Facility: HOSPITAL | Age: 45
Discharge: HOME | End: 2025-01-04
Attending: EMERGENCY MEDICINE

## 2025-01-03 ENCOUNTER — APPOINTMENT (EMERGENCY)
Dept: CT IMAGING | Facility: HOSPITAL | Age: 45
End: 2025-01-03

## 2025-01-03 VITALS
OXYGEN SATURATION: 96 % | HEART RATE: 72 BPM | RESPIRATION RATE: 16 BRPM | DIASTOLIC BLOOD PRESSURE: 78 MMHG | SYSTOLIC BLOOD PRESSURE: 129 MMHG | TEMPERATURE: 97.5 F

## 2025-01-03 DIAGNOSIS — R10.9 ABDOMINAL PAIN: Primary | ICD-10-CM

## 2025-01-03 DIAGNOSIS — R11.0 NAUSEA: ICD-10-CM

## 2025-01-03 LAB
ALBUMIN SERPL BCG-MCNC: 4.2 G/DL (ref 3.5–5)
ALP SERPL-CCNC: 79 U/L (ref 34–104)
ALT SERPL W P-5'-P-CCNC: 14 U/L (ref 7–52)
ANION GAP SERPL CALCULATED.3IONS-SCNC: 5 MMOL/L (ref 4–13)
AST SERPL W P-5'-P-CCNC: 15 U/L (ref 13–39)
BASOPHILS # BLD AUTO: 0.04 THOUSANDS/ΜL (ref 0–0.1)
BASOPHILS NFR BLD AUTO: 1 % (ref 0–1)
BILIRUB SERPL-MCNC: 0.24 MG/DL (ref 0.2–1)
BILIRUB UR QL STRIP: NEGATIVE
BUN SERPL-MCNC: 18 MG/DL (ref 5–25)
CALCIUM SERPL-MCNC: 9.5 MG/DL (ref 8.4–10.2)
CHLORIDE SERPL-SCNC: 109 MMOL/L (ref 96–108)
CLARITY UR: CLEAR
CO2 SERPL-SCNC: 26 MMOL/L (ref 21–32)
COLOR UR: NORMAL
CREAT SERPL-MCNC: 0.72 MG/DL (ref 0.6–1.3)
EOSINOPHIL # BLD AUTO: 0.16 THOUSAND/ΜL (ref 0–0.61)
EOSINOPHIL NFR BLD AUTO: 2 % (ref 0–6)
ERYTHROCYTE [DISTWIDTH] IN BLOOD BY AUTOMATED COUNT: 12.1 % (ref 11.6–15.1)
GFR SERPL CREATININE-BSD FRML MDRD: 103 ML/MIN/1.73SQ M
GLUCOSE SERPL-MCNC: 103 MG/DL (ref 65–140)
GLUCOSE UR STRIP-MCNC: NEGATIVE MG/DL
HCG SERPL QL: NEGATIVE
HCT VFR BLD AUTO: 41.1 % (ref 34.8–46.1)
HGB BLD-MCNC: 13.7 G/DL (ref 11.5–15.4)
HGB UR QL STRIP.AUTO: NEGATIVE
IMM GRANULOCYTES # BLD AUTO: 0.02 THOUSAND/UL (ref 0–0.2)
IMM GRANULOCYTES NFR BLD AUTO: 0 % (ref 0–2)
KETONES UR STRIP-MCNC: NEGATIVE MG/DL
LEUKOCYTE ESTERASE UR QL STRIP: NEGATIVE
LIPASE SERPL-CCNC: 32 U/L (ref 11–82)
LYMPHOCYTES # BLD AUTO: 2.89 THOUSANDS/ΜL (ref 0.6–4.47)
LYMPHOCYTES NFR BLD AUTO: 43 % (ref 14–44)
MCH RBC QN AUTO: 30.3 PG (ref 26.8–34.3)
MCHC RBC AUTO-ENTMCNC: 33.3 G/DL (ref 31.4–37.4)
MCV RBC AUTO: 91 FL (ref 82–98)
MONOCYTES # BLD AUTO: 0.54 THOUSAND/ΜL (ref 0.17–1.22)
MONOCYTES NFR BLD AUTO: 8 % (ref 4–12)
NEUTROPHILS # BLD AUTO: 3.09 THOUSANDS/ΜL (ref 1.85–7.62)
NEUTS SEG NFR BLD AUTO: 46 % (ref 43–75)
NITRITE UR QL STRIP: NEGATIVE
NRBC BLD AUTO-RTO: 0 /100 WBCS
PH UR STRIP.AUTO: 6.5 [PH]
PLATELET # BLD AUTO: 189 THOUSANDS/UL (ref 149–390)
PMV BLD AUTO: 11 FL (ref 8.9–12.7)
POTASSIUM SERPL-SCNC: 4 MMOL/L (ref 3.5–5.3)
PROT SERPL-MCNC: 7 G/DL (ref 6.4–8.4)
PROT UR STRIP-MCNC: NEGATIVE MG/DL
RBC # BLD AUTO: 4.52 MILLION/UL (ref 3.81–5.12)
SODIUM SERPL-SCNC: 140 MMOL/L (ref 135–147)
SP GR UR STRIP.AUTO: 1.03 (ref 1–1.03)
UROBILINOGEN UR STRIP-ACNC: <2 MG/DL
WBC # BLD AUTO: 6.74 THOUSAND/UL (ref 4.31–10.16)

## 2025-01-03 PROCEDURE — 74177 CT ABD & PELVIS W/CONTRAST: CPT

## 2025-01-03 PROCEDURE — 36415 COLL VENOUS BLD VENIPUNCTURE: CPT

## 2025-01-03 PROCEDURE — 96361 HYDRATE IV INFUSION ADD-ON: CPT

## 2025-01-03 PROCEDURE — 80053 COMPREHEN METABOLIC PANEL: CPT

## 2025-01-03 PROCEDURE — 96375 TX/PRO/DX INJ NEW DRUG ADDON: CPT

## 2025-01-03 PROCEDURE — 85025 COMPLETE CBC W/AUTO DIFF WBC: CPT

## 2025-01-03 PROCEDURE — 99284 EMERGENCY DEPT VISIT MOD MDM: CPT

## 2025-01-03 PROCEDURE — 96365 THER/PROPH/DIAG IV INF INIT: CPT

## 2025-01-03 PROCEDURE — 83690 ASSAY OF LIPASE: CPT

## 2025-01-03 PROCEDURE — 84703 CHORIONIC GONADOTROPIN ASSAY: CPT

## 2025-01-03 PROCEDURE — 81003 URINALYSIS AUTO W/O SCOPE: CPT

## 2025-01-03 RX ORDER — ONDANSETRON 2 MG/ML
4 INJECTION INTRAMUSCULAR; INTRAVENOUS ONCE
Status: COMPLETED | OUTPATIENT
Start: 2025-01-03 | End: 2025-01-03

## 2025-01-03 RX ORDER — ACETAMINOPHEN 10 MG/ML
1000 INJECTION, SOLUTION INTRAVENOUS ONCE
Status: COMPLETED | OUTPATIENT
Start: 2025-01-03 | End: 2025-01-03

## 2025-01-03 RX ADMIN — SODIUM CHLORIDE 1000 ML: 0.9 INJECTION, SOLUTION INTRAVENOUS at 22:23

## 2025-01-03 RX ADMIN — ACETAMINOPHEN 1000 MG: 10 INJECTION, SOLUTION INTRAVENOUS at 22:24

## 2025-01-03 RX ADMIN — IOHEXOL 100 ML: 350 INJECTION, SOLUTION INTRAVENOUS at 23:07

## 2025-01-03 RX ADMIN — ONDANSETRON 4 MG: 2 INJECTION INTRAMUSCULAR; INTRAVENOUS at 22:24

## 2025-01-04 PROCEDURE — 96375 TX/PRO/DX INJ NEW DRUG ADDON: CPT

## 2025-01-04 RX ORDER — ONDANSETRON 4 MG/1
4 TABLET, ORALLY DISINTEGRATING ORAL EVERY 8 HOURS PRN
Qty: 9 TABLET | Refills: 0 | Status: SHIPPED | OUTPATIENT
Start: 2025-01-04 | End: 2025-01-07

## 2025-01-04 RX ORDER — KETOROLAC TROMETHAMINE 30 MG/ML
15 INJECTION, SOLUTION INTRAMUSCULAR; INTRAVENOUS ONCE
Status: COMPLETED | OUTPATIENT
Start: 2025-01-04 | End: 2025-01-04

## 2025-01-04 RX ADMIN — KETOROLAC TROMETHAMINE 15 MG: 30 INJECTION, SOLUTION INTRAMUSCULAR; INTRAVENOUS at 00:34

## 2025-01-04 NOTE — DISCHARGE INSTRUCTIONS
"CT, \"IMPRESSION:     No acute findings in the abdomen or pelvis\".    Tylenol and Motrin for pain.  Zofran as needed.    Follow-up with GI, referral placed.  Follow-up with your PCP and return to the ER for any worsening symptoms.  "

## 2025-01-04 NOTE — ED PROVIDER NOTES
Time reflects when diagnosis was documented in both MDM as applicable and the Disposition within this note       Time User Action Codes Description Comment    1/4/2025 12:22 AM Leonela Arambula Add [R10.9] Abdominal pain     1/4/2025 12:22 AM Leonela Arambula Add [R11.0] Nausea           ED Disposition       ED Disposition   Discharge    Condition   Stable    Date/Time   Sat Jan 4, 2025 12:22 AM    Comment   Flori Roxie discharge to home/self care.                   Assessment & Plan       Medical Decision Making  Vital signs stable and patient well-appearing throughout ER course.  Labs and UA stable.  No acute findings on CT scan.  Improvement of symptoms after medications given in the ER.  Provided patient education and discussed return precautions.  Patient to follow-up with GI, referral placed.  Patient to follow-up with her PCP and return to the ER for any worsening symptoms.  Patient verbalizes understanding and agrees to discharge plan.  Patient also provided with written discharge instructions.    Amount and/or Complexity of Data Reviewed  Labs: ordered.  Radiology: ordered. Decision-making details documented in ED Course.    Risk  Prescription drug management.        ED Course as of 01/04/25 0220   Sat Jan 04, 2025   0022 CT abdomen pelvis with contrast  IMPRESSION:     No acute findings in the abdomen or pelvis.            Medications   sodium chloride 0.9 % bolus 1,000 mL (0 mL Intravenous Stopped 1/3/25 2356)   acetaminophen (Ofirmev) injection 1,000 mg (0 mg Intravenous Stopped 1/3/25 2240)   ondansetron (ZOFRAN) injection 4 mg (4 mg Intravenous Given 1/3/25 2224)   iohexol (OMNIPAQUE) 350 MG/ML injection (MULTI-DOSE) 100 mL (100 mL Intravenous Given 1/3/25 2307)   ketorolac (TORADOL) injection 15 mg (15 mg Intravenous Given 1/4/25 0034)       ED Risk Strat Scores                          SBIRT 20yo+      Flowsheet Row Most Recent Value   Initial Alcohol Screen: US AUDIT-C     1. How often do you have a  drink containing alcohol? 1 Filed at: 2025   2. How many drinks containing alcohol do you have on a typical day you are drinking?  0 Filed at: 2025   3a. Male UNDER 65: How often do you have five or more drinks on one occasion? 0 Filed at: 2025   3b. FEMALE Any Age, or MALE 65+: How often do you have 4 or more drinks on one occassion? 0 Filed at: 2025   Audit-C Score 1 Filed at: 2025   GRANT: How many times in the past year have you...    Used an illegal drug or used a prescription medication for non-medical reasons? Never Filed at: 2025                            History of Present Illness       Chief Complaint   Patient presents with    Abdominal Pain     Came in for worsening abdominal pain started a week ago.        Past Medical History:   Diagnosis Date    Cholelithiasis     Colon polyp       Past Surgical History:   Procedure Laterality Date    COLONOSCOPY      less than 5 yrs ago    COMBINED ABDOMINOPLASTY AND LIPOSUCTION N/A     HYSTERECTOMY        Family History   Problem Relation Age of Onset    No Known Problems Mother     Cancer Father         large intestine      Social History     Tobacco Use    Smoking status: Former     Current packs/day: 0.00     Types: Cigarettes     Quit date: 2021     Years since quittin.0    Smokeless tobacco: Never    Tobacco comments:     vapes   Vaping Use    Vaping status: Every Day    Substances: Nicotine, Flavoring   Substance Use Topics    Alcohol use: Yes     Comment: occ    Drug use: No      E-Cigarette/Vaping    E-Cigarette Use Current Every Day User       E-Cigarette/Vaping Substances    Nicotine Yes     THC No     CBD No     Flavoring Yes     Other No     Unknown No       I have reviewed and agree with the history as documented.     Patient is a 44-year-old female who presents to the emergency room for abdominal pain.  Reports abdominal pain has been on and off for weeks.  Reports left-sided  abdominal pain has been constant and worsening x6 days.  Associated nausea.  Denies any other symptoms.  Has been trying herbal tea at home.           Review of Systems   Constitutional:  Negative for chills and fever.   HENT:  Negative for ear pain, sore throat, trouble swallowing and voice change.    Eyes:  Negative for pain and visual disturbance.   Respiratory:  Negative for cough and shortness of breath.    Cardiovascular:  Negative for chest pain and palpitations.   Gastrointestinal:  Positive for abdominal pain and nausea. Negative for blood in stool, constipation, diarrhea and vomiting.   Genitourinary:  Negative for dysuria, flank pain and hematuria.   Musculoskeletal:  Negative for arthralgias and back pain.   Skin:  Negative for color change and rash.   Neurological:  Negative for seizures, syncope and headaches.   Psychiatric/Behavioral:  Negative for confusion.    All other systems reviewed and are negative.          Objective       ED Triage Vitals [01/03/25 2158]   Temperature Pulse Blood Pressure Respirations SpO2 Patient Position - Orthostatic VS   97.5 °F (36.4 °C) 83 130/77 14 98 % Sitting      Temp src Heart Rate Source BP Location FiO2 (%) Pain Score    -- Monitor Left arm -- --      Vitals      Date and Time Temp Pulse SpO2 Resp BP Pain Score FACES Pain Rating User   01/03/25 2230 -- 72 96 % 16 129/78 -- -- NW   01/03/25 2158 97.5 °F (36.4 °C) 83 98 % 14 130/77 -- -- NO            Physical Exam  Vitals and nursing note reviewed.   Constitutional:       General: She is not in acute distress.     Appearance: Normal appearance. She is well-developed.   HENT:      Head: Normocephalic and atraumatic.   Eyes:      Conjunctiva/sclera: Conjunctivae normal.   Cardiovascular:      Rate and Rhythm: Normal rate and regular rhythm.      Pulses: Normal pulses.      Heart sounds: No murmur heard.  Pulmonary:      Effort: Pulmonary effort is normal. No respiratory distress.      Breath sounds: Normal breath  sounds.   Abdominal:      Palpations: Abdomen is soft.      Tenderness: There is abdominal tenderness in the suprapubic area and left lower quadrant.   Musculoskeletal:         General: No swelling.      Cervical back: Neck supple.   Skin:     General: Skin is warm and dry.      Capillary Refill: Capillary refill takes less than 2 seconds.   Neurological:      General: No focal deficit present.      Mental Status: She is alert and oriented to person, place, and time.   Psychiatric:         Mood and Affect: Mood normal.         Results Reviewed       Procedure Component Value Units Date/Time    hCG, qualitative pregnancy [[id number]]  (Normal) Collected: 01/03/25 2214    Lab Status: Final result Specimen: Blood from Arm, Right Updated: 01/03/25 2250     Preg, Serum Negative    Comprehensive metabolic panel [[id number]]  (Abnormal) Collected: 01/03/25 2214    Lab Status: Final result Specimen: Blood from Arm, Right Updated: 01/03/25 2245     Sodium 140 mmol/L      Potassium 4.0 mmol/L      Chloride 109 mmol/L      CO2 26 mmol/L      ANION GAP 5 mmol/L      BUN 18 mg/dL      Creatinine 0.72 mg/dL      Glucose 103 mg/dL      Calcium 9.5 mg/dL      AST 15 U/L      ALT 14 U/L      Alkaline Phosphatase 79 U/L      Total Protein 7.0 g/dL      Albumin 4.2 g/dL      Total Bilirubin 0.24 mg/dL      eGFR 103 ml/min/1.73sq m     Narrative:      National Kid[employer] Foundation guidelines for Chronic Kidney Disease (CKD):     Stage 1 with normal or high GFR (GFR > 90 mL/min/1.73 square meters)    Stage 2 Mild CKD (GFR = 60-89 mL/min/1.73 square meters)    Stage 3A Moderate CKD (GFR = 45-59 mL/min/1.73 square meters)    Stage 3B Moderate CKD (GFR = 30-44 mL/min/1.73 square meters)    Stage 4 Severe CKD (GFR = 15-29 mL/min/1.73 square meters)    Stage 5 End Stage CKD (GFR <15 mL/min/1.73 square meters)  Note: GFR calculation is accurate only with a steady state creatinine    Lipase [[id number]]  (Normal) Collected: 01/03/25 2214     Lab Status: Final result Specimen: Blood from Arm, Right Updated: 25 2245     Lipase 32 u/L     UA w Reflex to Microscopic w Reflex to Culture [[id number]] Collected: 25    Lab Status: Final result Specimen: Urine, Clean Catch Updated: 25     Color, UA Light Yellow     Clarity, UA Clear     Specific Gravity, UA 1.027     pH, UA 6.5     Leukocytes, UA Negative     Nitrite, UA Negative     Protein, UA Negative mg/dl      Glucose, UA Negative mg/dl      Ketones, UA Negative mg/dl      Urobilinogen, UA <2.0 mg/dl      Bilirubin, UA Negative     Occult Blood, UA Negative    CBC and differential [[id number]] Collected: 25    Lab Status: Final result Specimen: Blood from Arm, Right Updated: 25     WBC 6.74 Thousand/uL      RBC 4.52 Million/uL      Hemoglobin 13.7 g/dL      Hematocrit 41.1 %      MCV 91 fL      MCH 30.3 pg      MCHC 33.3 g/dL      RDW 12.1 %      MPV 11.0 fL      Platelets 189 Thousands/uL      nRBC 0 /100 WBCs      Segmented % 46 %      Immature Grans % 0 %      Lymphocytes % 43 %      Monocytes % 8 %      Eosinophils Relative 2 %      Basophils Relative 1 %      Absolute Neutrophils 3.09 Thousands/µL      Absolute Immature Grans 0.02 Thousand/uL      Absolute Lymphocytes 2.89 Thousands/µL      Absolute Monocytes 0.54 Thousand/µL      Eosinophils Absolute 0.16 Thousand/µL      Basophils Absolute 0.04 Thousands/µL             CT abdomen pelvis with contrast   Final Interpretation by Armen Pulido MD (11)      No acute findings in the abdomen or pelvis.         Workstation performed: [id number]             Procedures    ED Medication and Procedure Management   Prior to Admission Medications   Prescriptions Last Dose Informant Patient Reported? Taking?   Ascorbic Acid 500 MG CHEW  Self Yes No   Si tab(s)   Riboflavin (VITAMIN B-2 PO)  Self Yes No   Sig: TAKE 2 TABLETS (200 MG TOTAL) BY MOUTH 2 (TWO) TIMES A DAY   cholecalciferol (VITAMIN D3) 1,000  units tablet  Self Yes No   Sig: Take 1,000 Units by mouth daily   diclofenac (VOLTAREN) 75 mg EC tablet  Self Yes No   Sig: Take 75 mg by mouth   dicyclomine (BENTYL) 20 mg tablet  Self No No   Sig: TAKE 1 TABLET (20 MG TOTAL) BY MOUTH EVERY 6 (SIX) HOURS AS NEEDED (ABDOMINAL PAIN)   estradiol (ESTRACE) 1 mg tablet  Self Yes No   Sig: Take 1 mg by mouth daily   Patient not taking: Reported on 2023   famotidine (PEPCID) 20 mg tablet   No No   Sig: TAKE 1 TABLET BY MOUTH TWICE A DAY AS NEEDED FOR HEARTBURN   fluticasone (FLONASE) 50 mcg/act nasal spray  Self Yes No   Si spray into each nostril   gabapentin (NEURONTIN) 300 mg capsule  Self Yes No   Sig: TAKE 1 CAPSULE BY MOUTH EVERY DAY AT NIGHT   ibuprofen (MOTRIN) 600 mg tablet  Self Yes No   Sig: TAKE 1 TABLET BY MOUTH EVERY 6 HOURS AS NEEDED FOR MILD PAIN (PAIN SCORE 1-3).   linaCLOtide (Linzess) 145 MCG CAPS  Self No No   Sig: Take 1 capsule (145 mcg total) by mouth daily in the early morning   loratadine (CLARITIN) 10 mg tablet  Self Yes No   Sig: Take 10 mg by mouth daily   magnesium oxide (MAG-OX) 400 mg  Self No No   Sig: Take 1 tablet (400 mg total) by mouth 2 (two) times a day   ondansetron (ZOFRAN) 4 mg tablet  Self Yes No   Sig: Pt takes when needed   promethazine-dextromethorphan (PHENERGAN-DM) 6.25-15 mg/5 mL oral syrup  Self Yes No   Sig: TAKE 5 MILLILITERS BY MOUTH 4 TIMES A DAY AS NEEDED FOR COUGH   traZODone (DESYREL) 50 mg tablet  Self Yes No   Sig: Take 50 mg by mouth   triamcinolone (KENALOG) 0.1 % cream  Self Yes No   Sig: Apply 1 application topically 2 (two) times a day To affected area   triamcinolone (KENALOG) 0.5 % cream  Self Yes No   Sig: Apply 1 application topically 3 (three) times a day   valACYclovir (VALTREX) 500 mg tablet  Self Yes No   Si tab(s)prn   zinc gluconate 50 mg tablet  Self Yes No   Sig: Take 50 mg by mouth daily      Facility-Administered Medications: None     Discharge Medication List as of 2025 12:27 AM         START taking these medications    Details   ondansetron (ZOFRAN-ODT) 4 mg disintegrating tablet Take 1 tablet (4 mg total) by mouth every 8 (eight) hours as needed for nausea or vomiting for up to 3 days, Starting Sat 1/4/2025, Until Tue 1/7/2025 at 2359, Normal           CONTINUE these medications which have NOT CHANGED    Details   Ascorbic Acid 500 MG CHEW 1 tab(s), Historical Med      cholecalciferol (VITAMIN D3) 1,000 units tablet Take 1,000 Units by mouth daily, Historical Med      diclofenac (VOLTAREN) 75 mg EC tablet Take 75 mg by mouth, Starting Tue 5/24/2022, Until Wed 6/28/2023 at 2359, Historical Med      dicyclomine (BENTYL) 20 mg tablet TAKE 1 TABLET (20 MG TOTAL) BY MOUTH EVERY 6 (SIX) HOURS AS NEEDED (ABDOMINAL PAIN), Starting Tue 1/31/2023, Normal      estradiol (ESTRACE) 1 mg tablet Take 1 mg by mouth daily, Starting Tue 1/17/2023, Historical Med      famotidine (PEPCID) 20 mg tablet TAKE 1 TABLET BY MOUTH TWICE A DAY AS NEEDED FOR HEARTBURN, Normal      fluticasone (FLONASE) 50 mcg/act nasal spray 1 spray into each nostril, Starting Tue 3/5/2019, Until Wed 6/28/2023 at 2359, Historical Med      gabapentin (NEURONTIN) 300 mg capsule TAKE 1 CAPSULE BY MOUTH EVERY DAY AT NIGHT, Historical Med      ibuprofen (MOTRIN) 600 mg tablet TAKE 1 TABLET BY MOUTH EVERY 6 HOURS AS NEEDED FOR MILD PAIN (PAIN SCORE 1-3)., Historical Med      linaCLOtide (Linzess) 145 MCG CAPS Take 1 capsule (145 mcg total) by mouth daily in the early morning, Starting Thu 1/26/2023, Sample      loratadine (CLARITIN) 10 mg tablet Take 10 mg by mouth daily, Starting Wed 8/17/2022, Historical Med      magnesium oxide (MAG-OX) 400 mg Take 1 tablet (400 mg total) by mouth 2 (two) times a day, Starting Tue 9/1/2020, Normal      ondansetron (ZOFRAN) 4 mg tablet Pt takes when needed, Historical Med      promethazine-dextromethorphan (PHENERGAN-DM) 6.25-15 mg/5 mL oral syrup TAKE 5 MILLILITERS BY MOUTH 4 TIMES A DAY AS NEEDED FOR  COUGH, Historical Med      Riboflavin (VITAMIN B-2 PO) TAKE 2 TABLETS (200 MG TOTAL) BY MOUTH 2 (TWO) TIMES A DAY, Historical Med      traZODone (DESYREL) 50 mg tablet Take 50 mg by mouth, Starting Wed 9/7/2022, Until Wed 6/28/2023 at 2359, Historical Med      triamcinolone (KENALOG) 0.1 % cream Apply 1 application topically 2 (two) times a day To affected area, Starting Tue 11/15/2022, Historical Med      triamcinolone (KENALOG) 0.5 % cream Apply 1 application topically 3 (three) times a day, Starting Fri 6/10/2022, Historical Med      valACYclovir (VALTREX) 500 mg tablet 1 tab(s)prn, Historical Med      zinc gluconate 50 mg tablet Take 50 mg by mouth daily, Historical Med             ED SEPSIS DOCUMENTATION   Time reflects when diagnosis was documented in both MDM as applicable and the Disposition within this note       Time User Action Codes Description Comment    1/4/2025 12:22 AM Leonela Arambula [R10.9] Abdominal pain     1/4/2025 12:22 AM Leonela Arambula [R11.0] Nausea                  Leonela Arambula PA-C  01/04/25 0220

## 2025-03-11 ENCOUNTER — OFFICE VISIT (OUTPATIENT)
Dept: GASTROENTEROLOGY | Facility: CLINIC | Age: 45
End: 2025-03-11
Payer: COMMERCIAL

## 2025-03-11 VITALS
WEIGHT: 126 LBS | TEMPERATURE: 97.5 F | HEIGHT: 65 IN | OXYGEN SATURATION: 99 % | SYSTOLIC BLOOD PRESSURE: 100 MMHG | BODY MASS INDEX: 20.99 KG/M2 | HEART RATE: 97 BPM | DIASTOLIC BLOOD PRESSURE: 72 MMHG

## 2025-03-11 DIAGNOSIS — K58.2 IRRITABLE BOWEL SYNDROME WITH MIXED BOWEL HABITS: Primary | ICD-10-CM

## 2025-03-11 DIAGNOSIS — K21.9 GASTROESOPHAGEAL REFLUX DISEASE, UNSPECIFIED WHETHER ESOPHAGITIS PRESENT: ICD-10-CM

## 2025-03-11 DIAGNOSIS — K58.1 IRRITABLE BOWEL SYNDROME WITH CONSTIPATION: ICD-10-CM

## 2025-03-11 PROCEDURE — 99213 OFFICE O/P EST LOW 20 MIN: CPT | Performed by: PHYSICIAN ASSISTANT

## 2025-03-11 RX ORDER — AMITRIPTYLINE HYDROCHLORIDE 10 MG/1
10 TABLET ORAL
Qty: 30 TABLET | Refills: 3 | Status: SHIPPED | OUTPATIENT
Start: 2025-03-11

## 2025-03-11 RX ORDER — DICYCLOMINE HCL 20 MG
20 TABLET ORAL EVERY 6 HOURS PRN
Qty: 360 TABLET | Refills: 1 | Status: SHIPPED | OUTPATIENT
Start: 2025-03-11

## 2025-03-11 RX ORDER — PANTOPRAZOLE SODIUM 40 MG/1
40 TABLET, DELAYED RELEASE ORAL DAILY
Qty: 30 TABLET | Refills: 3 | Status: SHIPPED | OUTPATIENT
Start: 2025-03-11

## 2025-03-11 NOTE — PROGRESS NOTES
Name: Flori Faustin      : 1980      MRN: [id number]  Encounter Provider: Lida Wylie PA-C  Encounter Date: 3/11/2025   Encounter department: [facility] [place]  :  Assessment & Plan  Irritable bowel syndrome with mixed bowel habits  Worsening abdominal pain - mostly left sided, bloating and ongoing mixed bowel habits  She is frustrated as she previously failed natural supplements such as fennel, peppermint oil, fiber and probiotic  She has never tried any specific diets - we discussed trial of GFD or low FODMAP diet    She has concerns about missed pathology  She is about due for a colonoscopy as she has a history of an adenomatous polyp -will repeat  CT AP from January was normal - we reviewed that this evaluated her pancreas, liver, small bowel and lymph nodes  Labs from the same day were normal as well    She needs to use Dicyclomine PRN    She is also struggling with headaches and insomnia - will trial Elavil 10mg at bedtime  Gastroesophageal reflux disease, unspecified whether esophagitis present  Mostly upper abdominal pain  Will restart Pantoprazole  Will plan EGD            History of Present Illness   HPI  Flori Faustin is a 44 y.o. female with a long history of GERD and irritable bowel syndrome who presents for evaluation of worsening symptoms.  She reports that for the past couple of months she has been having she feels her different symptoms than her typical IBS pattern.  She reports that her bowel movements fluctuate between constipation, loose and regular.  She is constantly bloated.  She has pain in the left abdomen which can be quite severe.  She has been to the emergency room for this.  CT scan and labs were unremarkable for acute pathology or anything that would explain the symptoms.  She is frustrated as she is concerned that there is missed pathology.  Her last EGD and colonoscopy were in  at which time an adenomatous polyp was removed from the  "colon.  She is worried about waiting until next year to have this repeated.  Unfortunately, she ran out of medication since her last appointment and is not currently taking anything.  She has been trying natural supplements such as fennel, fiber and peppermint oil which are not helpful.  She has never tried any specific diets for her IBS.  She is not eating well.  She is losing weight.      Review of Systems       Objective   /72 (BP Location: Right arm, Patient Position: Sitting, Cuff Size: Standard)   Pulse 97   Temp 97.5 °F (36.4 °C) (Temporal)   Ht 5' 5\" (1.651 m)   Wt 57.2 kg (126 lb)   LMP 09/15/2022 (Approximate)   SpO2 99%   BMI 20.97 kg/m²      Physical Exam  Vitals reviewed.   Constitutional:       Appearance: Normal appearance.   HENT:      Head: Normocephalic and atraumatic.   Eyes:      Extraocular Movements: Extraocular movements intact.      Pupils: Pupils are equal, round, and reactive to light.   Cardiovascular:      Rate and Rhythm: Normal rate and regular rhythm.   Abdominal:      General: Bowel sounds are normal. There is no distension.      Palpations: Abdomen is soft. There is no mass.      Tenderness: There is abdominal tenderness.   Skin:     General: Skin is warm and dry.      Coloration: Skin is not jaundiced.   Neurological:      General: No focal deficit present.      Mental Status: She is alert and oriented to person, place, and time.           "

## 2025-03-11 NOTE — PATIENT INSTRUCTIONS
Scheduled date of EGD/colonoscopy (as of today):3/17/25  Physician performing EGD/colonoscopy:Magdy  Location of EGD/colonoscopy:[facility]  Desired bowel prep reviewed with patient:Carla/Miralax  Instructions reviewed with patient by:Gallo vizcaino  Clearances:   none

## 2025-03-17 ENCOUNTER — HOSPITAL ENCOUNTER (OUTPATIENT)
Dept: GASTROENTEROLOGY | Facility: HOSPITAL | Age: 45
Setting detail: OUTPATIENT SURGERY
Discharge: HOME | End: 2025-03-17
Payer: COMMERCIAL

## 2025-03-17 ENCOUNTER — ANESTHESIA (OUTPATIENT)
Dept: GASTROENTEROLOGY | Facility: HOSPITAL | Age: 45
End: 2025-03-17
Payer: COMMERCIAL

## 2025-03-17 ENCOUNTER — ANESTHESIA EVENT (OUTPATIENT)
Dept: GASTROENTEROLOGY | Facility: HOSPITAL | Age: 45
End: 2025-03-17
Payer: COMMERCIAL

## 2025-03-17 VITALS
TEMPERATURE: 98.1 F | HEART RATE: 79 BPM | SYSTOLIC BLOOD PRESSURE: 115 MMHG | OXYGEN SATURATION: 98 % | DIASTOLIC BLOOD PRESSURE: 70 MMHG | BODY MASS INDEX: 20.53 KG/M2 | WEIGHT: 123.24 LBS | RESPIRATION RATE: 16 BRPM | HEIGHT: 65 IN

## 2025-03-17 DIAGNOSIS — K21.9 GASTROESOPHAGEAL REFLUX DISEASE, UNSPECIFIED WHETHER ESOPHAGITIS PRESENT: ICD-10-CM

## 2025-03-17 DIAGNOSIS — K58.1 IRRITABLE BOWEL SYNDROME WITH CONSTIPATION: ICD-10-CM

## 2025-03-17 PROCEDURE — 88342 IMHCHEM/IMCYTCHM 1ST ANTB: CPT | Performed by: PATHOLOGY

## 2025-03-17 PROCEDURE — 88305 TISSUE EXAM BY PATHOLOGIST: CPT | Performed by: PATHOLOGY

## 2025-03-17 RX ORDER — SODIUM CHLORIDE, SODIUM LACTATE, POTASSIUM CHLORIDE, CALCIUM CHLORIDE 600; 310; 30; 20 MG/100ML; MG/100ML; MG/100ML; MG/100ML
INJECTION, SOLUTION INTRAVENOUS CONTINUOUS PRN
Status: DISCONTINUED | OUTPATIENT
Start: 2025-03-17 | End: 2025-03-17

## 2025-03-17 RX ORDER — LIDOCAINE HYDROCHLORIDE 20 MG/ML
INJECTION, SOLUTION EPIDURAL; INFILTRATION; INTRACAUDAL; PERINEURAL AS NEEDED
Status: DISCONTINUED | OUTPATIENT
Start: 2025-03-17 | End: 2025-03-17

## 2025-03-17 RX ORDER — SODIUM CHLORIDE, SODIUM LACTATE, POTASSIUM CHLORIDE, CALCIUM CHLORIDE 600; 310; 30; 20 MG/100ML; MG/100ML; MG/100ML; MG/100ML
75 INJECTION, SOLUTION INTRAVENOUS CONTINUOUS
Status: DISCONTINUED | OUTPATIENT
Start: 2025-03-17 | End: 2025-03-21 | Stop reason: HOSPADM

## 2025-03-17 RX ORDER — PROPOFOL 10 MG/ML
INJECTION, EMULSION INTRAVENOUS AS NEEDED
Status: DISCONTINUED | OUTPATIENT
Start: 2025-03-17 | End: 2025-03-17

## 2025-03-17 RX ADMIN — PROPOFOL 100 MG: 10 INJECTION, EMULSION INTRAVENOUS at 09:14

## 2025-03-17 RX ADMIN — SODIUM CHLORIDE, SODIUM LACTATE, POTASSIUM CHLORIDE, AND CALCIUM CHLORIDE 75 ML/HR: .6; .31; .03; .02 INJECTION, SOLUTION INTRAVENOUS at 08:41

## 2025-03-17 RX ADMIN — PROPOFOL 30 MG: 10 INJECTION, EMULSION INTRAVENOUS at 09:31

## 2025-03-17 RX ADMIN — PROPOFOL 30 MG: 10 INJECTION, EMULSION INTRAVENOUS at 09:27

## 2025-03-17 RX ADMIN — LIDOCAINE HYDROCHLORIDE 100 MG: 20 INJECTION, SOLUTION EPIDURAL; INFILTRATION; INTRACAUDAL; PERINEURAL at 09:14

## 2025-03-17 RX ADMIN — PROPOFOL 30 MG: 10 INJECTION, EMULSION INTRAVENOUS at 09:19

## 2025-03-17 RX ADMIN — PROPOFOL 30 MG: 10 INJECTION, EMULSION INTRAVENOUS at 09:24

## 2025-03-17 RX ADMIN — PROPOFOL 30 MG: 10 INJECTION, EMULSION INTRAVENOUS at 09:17

## 2025-03-17 RX ADMIN — PROPOFOL 30 MG: 10 INJECTION, EMULSION INTRAVENOUS at 09:15

## 2025-03-17 RX ADMIN — PROPOFOL 30 MG: 10 INJECTION, EMULSION INTRAVENOUS at 09:34

## 2025-03-17 RX ADMIN — PROPOFOL 30 MG: 10 INJECTION, EMULSION INTRAVENOUS at 09:29

## 2025-03-17 RX ADMIN — SODIUM CHLORIDE, SODIUM LACTATE, POTASSIUM CHLORIDE, AND CALCIUM CHLORIDE: .6; .31; .03; .02 INJECTION, SOLUTION INTRAVENOUS at 09:00

## 2025-03-17 RX ADMIN — PROPOFOL 20 MG: 10 INJECTION, EMULSION INTRAVENOUS at 09:16

## 2025-03-17 RX ADMIN — PROPOFOL 30 MG: 10 INJECTION, EMULSION INTRAVENOUS at 09:21

## 2025-03-17 NOTE — ANESTHESIA PREPROCEDURE EVALUATION
Procedure:  COLONOSCOPY  EGD    Relevant Problems   ANESTHESIA (within normal limits)      CARDIO (within normal limits)      ENDO (within normal limits)      GI/HEPATIC (within normal limits)      /RENAL (within normal limits)      GYN (within normal limits)      HEMATOLOGY (within normal limits)      MUSCULOSKELETAL (within normal limits)      NEURO/PSYCH   (+) Numbness and tingling in both hands      PULMONARY (within normal limits)        Physical Exam    Airway    Mallampati score: II  TM Distance: >3 FB  Neck ROM: full     Dental   No notable dental hx     Cardiovascular  Cardiovascular exam normal    Pulmonary  Pulmonary exam normal     Other Findings  post-pubertal.      Anesthesia Plan  ASA Score- 1     Anesthesia Type- IV sedation with anesthesia with ASA Monitors.         Additional Monitors:     Airway Plan:            Plan Factors-Exercise tolerance (METS): >4 METS.    Chart reviewed.  Imaging results reviewed. Existing labs reviewed. Patient summary reviewed.    Patient is a current smoker.  Patient instructed to abstain from smoking on day of procedure. Patient did not smoke on day of surgery.            Induction- intravenous.    Postoperative Plan-     Perioperative Resuscitation Plan - Level 1 - Full Code.       Informed Consent- Anesthetic plan and risks discussed with patient.        NPO Status:  Vitals Value Taken Time   Date of last liquid 03/16/25 03/17/25 0830   Time of last liquid 2300 03/17/25 0830   Date of last solid 03/15/25 03/17/25 0830   Time of last solid 2200 03/17/25 0830       Discussed IV Sedation with General Anesthesia as backup with patient including but not limited to risk of cardiac insult, pulmonary complication, stroke, reaction to medications and death. All questions answered and consent was obtained.

## 2025-03-17 NOTE — H&P
"History and Physical -  [facility]  Flori Faustin 44 y.o. female MRN: [id number]      HPI: Flori Faustin is a 44 y.o. year old female who presents for evaluation of irritable bowel syndrome, screening colonoscopy, gastroesophageal reflux disease      REVIEW OF SYSTEMS: Per the HPI, and otherwise unremarkable.    Historical Information   Past Medical History:   Diagnosis Date    Cholelithiasis     Colon polyp      Past Surgical History:   Procedure Laterality Date    COLONOSCOPY      less than 5 yrs ago    COMBINED ABDOMINOPLASTY AND LIPOSUCTION N/A     HYSTERECTOMY       Social History   Social History     Substance and Sexual Activity   Alcohol Use Yes    Comment: occ     Social History     Substance and Sexual Activity   Drug Use No     Social History     Tobacco Use   Smoking Status Every Day    Current packs/day: 0.00    Types: Cigarettes    Last attempt to quit: 2021    Years since quittin.2   Smokeless Tobacco Never   Tobacco Comments    vapes     Family History   Problem Relation Age of Onset    No Known Problems Mother     Cancer Father         large intestine       Meds/Allergies     Not in a hospital admission.    Allergies   Allergen Reactions    Amoxicillin Itching    Pollen Extract Itching     Sneezing, itching in the eyes       Objective     Blood pressure 104/67, pulse 74, temperature (!) 97.4 °F (36.3 °C), temperature source Temporal, resp. rate 15, height 5' 5\" (1.651 m), weight 55.9 kg (123 lb 3.8 oz), last menstrual period 09/15/2022, SpO2 96%.      PHYSICAL EXAM    Gen: NAD  CV: RRR  CHEST: Clear  ABD: soft, NT/ND  EXT: no edema      ASSESSMENT/PLAN:  This is a 44 y.o. year old female here for EGD, colonoscopy, and she is stable and optimized for her procedure.          "

## 2025-03-17 NOTE — ANESTHESIA POSTPROCEDURE EVALUATION
Post-Op Assessment Note    CV Status:  Stable    Pain management: adequate       Mental Status:  Alert and awake   Hydration Status:  Euvolemic   PONV Controlled:  Controlled   Airway Patency:  Patent     Post Op Vitals Reviewed: Yes    No anethesia notable event occurred.    Staff: CRNA           Last Filed PACU Vitals:  Vitals Value Taken Time   Temp 98.1 °F (36.7 °C) 03/17/25 0940   Pulse 75 03/17/25 0940   /60 03/17/25 0940   Resp 16 03/17/25 0940   SpO2 93 % 03/17/25 0940       Modified Trace:     Vitals Value Taken Time   Activity 2 03/17/25 0943   Respiration 2 03/17/25 0943   Circulation 2 03/17/25 0943   Consciousness 1 03/17/25 0943   Oxygen Saturation 2 03/17/25 0943     Modified Trace Score: 9

## 2025-03-21 PROCEDURE — 88342 IMHCHEM/IMCYTCHM 1ST ANTB: CPT | Performed by: PATHOLOGY

## 2025-03-21 PROCEDURE — 88305 TISSUE EXAM BY PATHOLOGIST: CPT | Performed by: PATHOLOGY

## 2025-03-26 ENCOUNTER — RESULTS FOLLOW-UP (OUTPATIENT)
Dept: GASTROENTEROLOGY | Facility: CLINIC | Age: 45
End: 2025-03-26

## 2025-04-01 NOTE — TELEPHONE ENCOUNTER
Called pt LMOM advising biopsy resutls.      Joss Curran, DO to Flori Roxie         3/26/25  6:40 AM  Flori,     The biopsies taken at the time of your recent endoscopy were benign.  Biopsies of the small intestine showed no evidence of cancer and no evidence of celiac disease.  Biopsies of the stomach showed no evidence of the bacteria called Helicobacter pylori and no evidence of cancer.     The 2 polyps of the sigmoid colon were both precancerous adenomas that were completely removed.  Your next colonoscopy is due in 5 years.    This [facility] MyChart message has not been read.

## 2025-04-01 NOTE — TELEPHONE ENCOUNTER
----- Message from Joss Curran DO sent at 3/31/2025  2:47 PM EDT -----  Patient has not read [employer] message. Please call and share results.

## 2025-04-03 ENCOUNTER — NURSE TRIAGE (OUTPATIENT)
Age: 45
End: 2025-04-03

## 2025-04-03 NOTE — TELEPHONE ENCOUNTER
"FOLLOW UP: PLEASE ADVISE    REASON FOR CONVERSATION: Abdominal Pain    SYMPTOMS: INTERMITTENT LUQ PAIN 7/10    OTHER: SYMPTOMS PRESENT FOR THE PAST 4 DAYS. DENIES FEVER, CHILLS, N/V. PT TAKING PANTOPRAZOLE 40 MG, BENTYL PRN. PT HAS NOT STARTED ELAVIL. REVIEWED RECENT EGD/COLONOSCOPY RESULTS.     DISPOSITION: Home Care with Provider Message (overriding Home Care)      Reason for Disposition   Mild abdominal pain    Answer Assessment - Initial Assessment Questions  1. LOCATION: \"Where does it hurt?\"       LUQ  2. RADIATION: \"Does the pain shoot anywhere else?\" (e.g., chest, back)      DENIES  3. ONSET: \"When did the pain begin?\" (e.g., minutes, hours or days ago)       4 DAYS  5. PATTERN \"Does the pain come and go, or is it constant?\"      INTERMITTENT  6. SEVERITY: \"How bad is the pain?\"  (e.g., Scale 1-10; mild, moderate, or severe)      7/10  8. CAUSE: \"What do you think is causing the stomach pain?\"      UNSURE  9. RELIEVING/AGGRAVATING FACTORS: \"What makes it better or worse?\" (e.g., antacids, bending or twisting motion, bowel movement)      DENIES  10. OTHER SYMPTOMS: \"Do you have any other symptoms?\" (e.g., back pain, diarrhea, fever, urination pain, vomiting)        DENIES    Protocols used: Abdominal Pain - Female-Adult-[place]    "

## 2025-04-03 NOTE — TELEPHONE ENCOUNTER
Spoke with pt, reviewed provider recommendations. She is going on vacation and will have alcoholic beverages and will try amitriptyline when she gets back

## 2025-04-04 DIAGNOSIS — K58.2 IRRITABLE BOWEL SYNDROME WITH MIXED BOWEL HABITS: ICD-10-CM

## 2025-04-04 DIAGNOSIS — K21.9 GASTROESOPHAGEAL REFLUX DISEASE, UNSPECIFIED WHETHER ESOPHAGITIS PRESENT: ICD-10-CM

## 2025-04-04 RX ORDER — AMITRIPTYLINE HYDROCHLORIDE 10 MG/1
10 TABLET ORAL
Qty: 90 TABLET | Refills: 1 | Status: SHIPPED | OUTPATIENT
Start: 2025-04-04

## 2025-04-04 RX ORDER — PANTOPRAZOLE SODIUM 40 MG/1
40 TABLET, DELAYED RELEASE ORAL DAILY
Qty: 90 TABLET | Refills: 1 | Status: SHIPPED | OUTPATIENT
Start: 2025-04-04